# Patient Record
Sex: MALE | Race: WHITE | Employment: OTHER | URBAN - METROPOLITAN AREA
[De-identification: names, ages, dates, MRNs, and addresses within clinical notes are randomized per-mention and may not be internally consistent; named-entity substitution may affect disease eponyms.]

---

## 2017-01-01 ENCOUNTER — TELEPHONE (OUTPATIENT)
Dept: LAB | Facility: OTHER | Age: 82
End: 2017-01-01

## 2017-01-01 ENCOUNTER — CARE COORDINATION (OUTPATIENT)
Dept: CARDIOLOGY | Facility: CLINIC | Age: 82
End: 2017-01-01

## 2017-01-01 ENCOUNTER — TRANSFERRED RECORDS (OUTPATIENT)
Dept: HEALTH INFORMATION MANAGEMENT | Facility: CLINIC | Age: 82
End: 2017-01-01

## 2017-01-01 ENCOUNTER — PRE VISIT (OUTPATIENT)
Dept: CARDIOLOGY | Facility: CLINIC | Age: 82
End: 2017-01-01

## 2017-01-01 ENCOUNTER — OFFICE VISIT (OUTPATIENT)
Dept: CARDIOLOGY | Facility: CLINIC | Age: 82
End: 2017-01-01
Payer: MEDICARE

## 2017-01-01 ENCOUNTER — OFFICE VISIT (OUTPATIENT)
Dept: NEPHROLOGY | Facility: CLINIC | Age: 82
End: 2017-01-01
Attending: INTERNAL MEDICINE
Payer: MEDICARE

## 2017-01-01 ENCOUNTER — OFFICE VISIT (OUTPATIENT)
Dept: PULMONOLOGY | Facility: CLINIC | Age: 82
End: 2017-01-01
Attending: INTERNAL MEDICINE
Payer: MEDICARE

## 2017-01-01 ENCOUNTER — TELEPHONE (OUTPATIENT)
Dept: ANTICOAGULATION | Facility: OTHER | Age: 82
End: 2017-01-01

## 2017-01-01 ENCOUNTER — TELEPHONE (OUTPATIENT)
Dept: TRANSPLANT | Facility: CLINIC | Age: 82
End: 2017-01-01

## 2017-01-01 ENCOUNTER — DOCUMENTATION ONLY (OUTPATIENT)
Dept: CARDIOLOGY | Facility: CLINIC | Age: 82
End: 2017-01-01

## 2017-01-01 ENCOUNTER — DOCUMENTATION ONLY (OUTPATIENT)
Dept: TRANSPLANT | Facility: CLINIC | Age: 82
End: 2017-01-01

## 2017-01-01 ENCOUNTER — TELEPHONE (OUTPATIENT)
Dept: CARDIOLOGY | Facility: CLINIC | Age: 82
End: 2017-01-01

## 2017-01-01 ENCOUNTER — TELEPHONE (OUTPATIENT)
Dept: FAMILY MEDICINE | Facility: OTHER | Age: 82
End: 2017-01-01

## 2017-01-01 VITALS
TEMPERATURE: 97.8 F | HEIGHT: 66 IN | BODY MASS INDEX: 34.23 KG/M2 | DIASTOLIC BLOOD PRESSURE: 68 MMHG | HEART RATE: 69 BPM | RESPIRATION RATE: 18 BRPM | WEIGHT: 213 LBS | SYSTOLIC BLOOD PRESSURE: 115 MMHG | OXYGEN SATURATION: 93 %

## 2017-01-01 VITALS
BODY MASS INDEX: 32.28 KG/M2 | WEIGHT: 200 LBS | SYSTOLIC BLOOD PRESSURE: 124 MMHG | OXYGEN SATURATION: 96 % | HEART RATE: 86 BPM | DIASTOLIC BLOOD PRESSURE: 77 MMHG

## 2017-01-01 VITALS
TEMPERATURE: 98.5 F | SYSTOLIC BLOOD PRESSURE: 120 MMHG | BODY MASS INDEX: 32.75 KG/M2 | DIASTOLIC BLOOD PRESSURE: 66 MMHG | HEART RATE: 130 BPM | WEIGHT: 203.8 LBS | HEIGHT: 66 IN | OXYGEN SATURATION: 91 %

## 2017-01-01 VITALS
OXYGEN SATURATION: 94 % | BODY MASS INDEX: 33.89 KG/M2 | WEIGHT: 210 LBS | HEART RATE: 64 BPM | DIASTOLIC BLOOD PRESSURE: 58 MMHG | SYSTOLIC BLOOD PRESSURE: 97 MMHG

## 2017-01-01 VITALS
BODY MASS INDEX: 32.47 KG/M2 | HEIGHT: 66 IN | DIASTOLIC BLOOD PRESSURE: 71 MMHG | HEART RATE: 76 BPM | RESPIRATION RATE: 18 BRPM | SYSTOLIC BLOOD PRESSURE: 121 MMHG | OXYGEN SATURATION: 97 % | WEIGHT: 202 LBS | TEMPERATURE: 97.6 F

## 2017-01-01 VITALS
SYSTOLIC BLOOD PRESSURE: 123 MMHG | DIASTOLIC BLOOD PRESSURE: 77 MMHG | OXYGEN SATURATION: 92 % | BODY MASS INDEX: 34.38 KG/M2 | HEART RATE: 72 BPM | WEIGHT: 213 LBS

## 2017-01-01 VITALS
BODY MASS INDEX: 31.49 KG/M2 | WEIGHT: 195 LBS | HEART RATE: 75 BPM | SYSTOLIC BLOOD PRESSURE: 89 MMHG | DIASTOLIC BLOOD PRESSURE: 61 MMHG | OXYGEN SATURATION: 95 %

## 2017-01-01 VITALS
OXYGEN SATURATION: 97 % | WEIGHT: 213.6 LBS | BODY MASS INDEX: 34.48 KG/M2 | SYSTOLIC BLOOD PRESSURE: 111 MMHG | DIASTOLIC BLOOD PRESSURE: 68 MMHG | HEART RATE: 74 BPM

## 2017-01-01 DIAGNOSIS — I50.32 CHRONIC DIASTOLIC HEART FAILURE (H): ICD-10-CM

## 2017-01-01 DIAGNOSIS — Z94.0 KIDNEY REPLACED BY TRANSPLANT: ICD-10-CM

## 2017-01-01 DIAGNOSIS — Z48.298 AFTERCARE FOLLOWING ORGAN TRANSPLANT: ICD-10-CM

## 2017-01-01 DIAGNOSIS — I48.91 ATRIAL FIBRILLATION, UNSPECIFIED TYPE (H): Primary | ICD-10-CM

## 2017-01-01 DIAGNOSIS — N25.81 SECONDARY RENAL HYPERPARATHYROIDISM (H): Primary | ICD-10-CM

## 2017-01-01 DIAGNOSIS — R91.1 LUNG NODULE: Primary | ICD-10-CM

## 2017-01-01 DIAGNOSIS — Z94.0 KIDNEY REPLACED BY TRANSPLANT: Primary | ICD-10-CM

## 2017-01-01 DIAGNOSIS — J84.9 ILD (INTERSTITIAL LUNG DISEASE) (H): ICD-10-CM

## 2017-01-01 DIAGNOSIS — I50.32 CHRONIC DIASTOLIC HEART FAILURE (H): Primary | ICD-10-CM

## 2017-01-01 DIAGNOSIS — Z79.899 ENCOUNTER FOR LONG-TERM CURRENT USE OF MEDICATION: ICD-10-CM

## 2017-01-01 DIAGNOSIS — I48.21 PERMANENT ATRIAL FIBRILLATION (H): Primary | ICD-10-CM

## 2017-01-01 DIAGNOSIS — G89.4 CHRONIC PAIN SYNDROME: ICD-10-CM

## 2017-01-01 DIAGNOSIS — Z94.0 KIDNEY TRANSPLANTED: Primary | ICD-10-CM

## 2017-01-01 DIAGNOSIS — E55.9 VITAMIN D DEFICIENCY: ICD-10-CM

## 2017-01-01 DIAGNOSIS — Z94.0 KIDNEY TRANSPLANTED: ICD-10-CM

## 2017-01-01 DIAGNOSIS — Z94.9 ORGAN TRANSPLANT: Primary | ICD-10-CM

## 2017-01-01 DIAGNOSIS — I15.1 HYPERTENSION SECONDARY TO OTHER RENAL DISORDERS: ICD-10-CM

## 2017-01-01 DIAGNOSIS — D84.9 IMMUNOSUPPRESSED STATUS (H): ICD-10-CM

## 2017-01-01 LAB
ANION GAP SERPL CALCULATED.3IONS-SCNC: 10 MMOL/L (ref 3–14)
ANION GAP SERPL CALCULATED.3IONS-SCNC: 11 MMOL/L (ref 3–14)
ANION GAP SERPL CALCULATED.3IONS-SCNC: 12 MMOL/L (ref 3–14)
ANION GAP SERPL CALCULATED.3IONS-SCNC: 7 MMOL/L (ref 3–14)
ANION GAP SERPL CALCULATED.3IONS-SCNC: 7 MMOL/L (ref 3–14)
ANION GAP SERPL CALCULATED.3IONS-SCNC: 8 MMOL/L (ref 3–14)
BUN SERPL-MCNC: 45 MG/DL (ref 7–30)
BUN SERPL-MCNC: 47 MG/DL (ref 7–30)
BUN SERPL-MCNC: 62 MG/DL (ref 7–30)
BUN SERPL-MCNC: 65 MG/DL (ref 7–30)
BUN SERPL-MCNC: 77 MG/DL (ref 7–30)
BUN SERPL-MCNC: 81 MG/DL (ref 7–30)
BUN SERPL-MCNC: 82 MG/DL (ref 7–30)
BUN SERPL-MCNC: 93 MG/DL (ref 7–30)
CALCIUM SERPL-MCNC: 8.5 MG/DL (ref 8.5–10.1)
CALCIUM SERPL-MCNC: 8.6 MG/DL (ref 8.5–10.1)
CALCIUM SERPL-MCNC: 8.7 MG/DL (ref 8.5–10.1)
CALCIUM SERPL-MCNC: 9 MG/DL (ref 8.5–10.1)
CALCIUM SERPL-MCNC: 9.2 MG/DL (ref 8.5–10.1)
CALCIUM SERPL-MCNC: 9.3 MG/DL (ref 8.5–10.1)
CHLORIDE SERPL-SCNC: 104 MMOL/L (ref 94–109)
CHLORIDE SERPL-SCNC: 105 MMOL/L (ref 94–109)
CHLORIDE SERPL-SCNC: 107 MMOL/L (ref 94–109)
CHLORIDE SERPL-SCNC: 107 MMOL/L (ref 94–109)
CHLORIDE SERPL-SCNC: 108 MMOL/L (ref 94–109)
CHLORIDE SERPL-SCNC: 110 MMOL/L (ref 94–109)
CO2 SERPL-SCNC: 22 MMOL/L (ref 20–32)
CO2 SERPL-SCNC: 23 MMOL/L (ref 20–32)
CO2 SERPL-SCNC: 24 MMOL/L (ref 20–32)
CO2 SERPL-SCNC: 26 MMOL/L (ref 20–32)
CO2 SERPL-SCNC: 27 MMOL/L (ref 20–32)
CO2 SERPL-SCNC: 28 MMOL/L (ref 20–32)
CO2 SERPL-SCNC: 28 MMOL/L (ref 20–32)
CO2 SERPL-SCNC: 29 MMOL/L (ref 20–32)
CREAT SERPL-MCNC: 2.03 MG/DL (ref 0.66–1.25)
CREAT SERPL-MCNC: 2.08 MG/DL (ref 0.66–1.25)
CREAT SERPL-MCNC: 2.1 MG/DL (ref 0.66–1.25)
CREAT SERPL-MCNC: 2.14 MG/DL (ref 0.66–1.25)
CREAT SERPL-MCNC: 2.25 MG/DL (ref 0.66–1.25)
CREAT SERPL-MCNC: 2.28 MG/DL (ref 0.66–1.25)
CREAT SERPL-MCNC: 2.34 MG/DL (ref 0.66–1.25)
CREAT SERPL-MCNC: 2.42 MG/DL (ref 0.66–1.25)
CYCLOSPORINE BLD LC/MS/MS-MCNC: 42 UG/L (ref 50–400)
CYCLOSPORINE BLD LC/MS/MS-MCNC: 82 UG/L (ref 50–400)
CYCLOSPORINE BLD LC/MS/MS-MCNC: 84 UG/L (ref 50–400)
DLCOCOR-%PRED-PRE: 57 %
DLCOCOR-PRE: 12.34 ML/MIN/MMHG
DLCOUNC-%PRED-PRE: 56 %
DLCOUNC-PRE: 12.01 ML/MIN/MMHG
DLCOUNC-PRED: 21.32 ML/MIN/MMHG
ERV-%PRED-PRE: 74 %
ERV-PRE: 0.24 L
ERV-PRED: 0.32 L
ERYTHROCYTE [DISTWIDTH] IN BLOOD BY AUTOMATED COUNT: 14.2 % (ref 10–15)
ERYTHROCYTE [DISTWIDTH] IN BLOOD BY AUTOMATED COUNT: 14.5 % (ref 10–15)
ERYTHROCYTE [DISTWIDTH] IN BLOOD BY AUTOMATED COUNT: 14.8 % (ref 10–15)
EXPTIME-PRE: 7.68 SEC
FEF2575-%PRED-PRE: 109 %
FEF2575-PRE: 1.89 L/SEC
FEF2575-PRED: 1.72 L/SEC
FEFMAX-%PRED-PRE: 107 %
FEFMAX-PRE: 6.31 L/SEC
FEFMAX-PRED: 5.87 L/SEC
FEV1-%PRED-PRE: 74 %
FEV1-PRE: 1.82 L
FEV1FEV6-PRE: 83 %
FEV1FEV6-PRED: 76 %
FEV1FVC-PRE: 83 %
FEV1FVC-PRED: 75 %
FEV1SVC-PRE: 85 %
FEV1SVC-PRED: 65 %
FIFMAX-PRE: 4.33 L/SEC
FVC-%PRED-PRE: 67 %
FVC-PRE: 2.19 L
FVC-PRED: 3.27 L
GFR SERPL CREATININE-BSD FRML MDRD: 26 ML/MIN/1.7M2
GFR SERPL CREATININE-BSD FRML MDRD: 27 ML/MIN/1.7M2
GFR SERPL CREATININE-BSD FRML MDRD: 27 ML/MIN/1.7M2
GFR SERPL CREATININE-BSD FRML MDRD: 28 ML/MIN/1.7M2
GFR SERPL CREATININE-BSD FRML MDRD: 30 ML/MIN/1.7M2
GFR SERPL CREATININE-BSD FRML MDRD: 30 ML/MIN/1.7M2
GFR SERPL CREATININE-BSD FRML MDRD: 31 ML/MIN/1.7M2
GFR SERPL CREATININE-BSD FRML MDRD: 31 ML/MIN/1.7M2
GLUCOSE SERPL-MCNC: 117 MG/DL (ref 70–99)
GLUCOSE SERPL-MCNC: 147 MG/DL (ref 70–99)
GLUCOSE SERPL-MCNC: 156 MG/DL (ref 70–99)
GLUCOSE SERPL-MCNC: 172 MG/DL (ref 70–99)
GLUCOSE SERPL-MCNC: 197 MG/DL (ref 70–99)
GLUCOSE SERPL-MCNC: 208 MG/DL (ref 70–99)
GLUCOSE SERPL-MCNC: 219 MG/DL (ref 70–99)
GLUCOSE SERPL-MCNC: 78 MG/DL (ref 70–99)
HCT VFR BLD AUTO: 42.5 % (ref 40–53)
HCT VFR BLD AUTO: 43.5 % (ref 40–53)
HCT VFR BLD AUTO: 43.5 % (ref 40–53)
HGB BLD-MCNC: 13.6 G/DL (ref 13.3–17.7)
HGB BLD-MCNC: 13.7 G/DL (ref 13.3–17.7)
HGB BLD-MCNC: 13.7 G/DL (ref 13.3–17.7)
IC-%PRED-PRE: 55 %
IC-PRE: 1.91 L
IC-PRED: 3.42 L
INR BLD: 1 (ref 0.86–1.14)
INR BLD: 1 (ref 0.86–1.14)
INR PPP: 0.97 (ref 0.86–1.14)
INR PPP: 0.98 (ref 0.86–1.14)
INR PPP: 0.99 (ref 0.86–1.14)
MCH RBC QN AUTO: 27.6 PG (ref 26.5–33)
MCH RBC QN AUTO: 27.8 PG (ref 26.5–33)
MCH RBC QN AUTO: 27.8 PG (ref 26.5–33)
MCHC RBC AUTO-ENTMCNC: 31.5 G/DL (ref 31.5–36.5)
MCHC RBC AUTO-ENTMCNC: 31.5 G/DL (ref 31.5–36.5)
MCHC RBC AUTO-ENTMCNC: 32 G/DL (ref 31.5–36.5)
MCV RBC AUTO: 87 FL (ref 78–100)
MCV RBC AUTO: 88 FL (ref 78–100)
MCV RBC AUTO: 88 FL (ref 78–100)
PLATELET # BLD AUTO: 137 10E9/L (ref 150–450)
PLATELET # BLD AUTO: 153 10E9/L (ref 150–450)
PLATELET # BLD AUTO: 157 10E9/L (ref 150–450)
POTASSIUM SERPL-SCNC: 4.4 MMOL/L (ref 3.4–5.3)
POTASSIUM SERPL-SCNC: 4.4 MMOL/L (ref 3.4–5.3)
POTASSIUM SERPL-SCNC: 4.5 MMOL/L (ref 3.4–5.3)
POTASSIUM SERPL-SCNC: 4.5 MMOL/L (ref 3.4–5.3)
POTASSIUM SERPL-SCNC: 4.6 MMOL/L (ref 3.4–5.3)
POTASSIUM SERPL-SCNC: 4.6 MMOL/L (ref 3.4–5.3)
POTASSIUM SERPL-SCNC: 4.7 MMOL/L (ref 3.4–5.3)
POTASSIUM SERPL-SCNC: 4.7 MMOL/L (ref 3.4–5.3)
RBC # BLD AUTO: 4.89 10E12/L (ref 4.4–5.9)
RBC # BLD AUTO: 4.92 10E12/L (ref 4.4–5.9)
RBC # BLD AUTO: 4.96 10E12/L (ref 4.4–5.9)
SODIUM SERPL-SCNC: 140 MMOL/L (ref 133–144)
SODIUM SERPL-SCNC: 140 MMOL/L (ref 133–144)
SODIUM SERPL-SCNC: 141 MMOL/L (ref 133–144)
SODIUM SERPL-SCNC: 142 MMOL/L (ref 133–144)
SODIUM SERPL-SCNC: 142 MMOL/L (ref 133–144)
SODIUM SERPL-SCNC: 143 MMOL/L (ref 133–144)
SODIUM SERPL-SCNC: 145 MMOL/L (ref 133–144)
SODIUM SERPL-SCNC: 146 MMOL/L (ref 133–144)
TME LAST DOSE: 2200 H
VA-%PRED-PRE: 58 %
VA-PRE: 3.53 L
VC-%PRED-PRE: 57 %
VC-PRE: 2.14 L
VC-PRED: 3.74 L
WBC # BLD AUTO: 4.8 10E9/L (ref 4–11)
WBC # BLD AUTO: 4.8 10E9/L (ref 4–11)
WBC # BLD AUTO: 5 10E9/L (ref 4–11)

## 2017-01-01 PROCEDURE — 36415 COLL VENOUS BLD VENIPUNCTURE: CPT | Performed by: PHYSICIAN ASSISTANT

## 2017-01-01 PROCEDURE — 80158 DRUG ASSAY CYCLOSPORINE: CPT | Performed by: INTERNAL MEDICINE

## 2017-01-01 PROCEDURE — 36415 COLL VENOUS BLD VENIPUNCTURE: CPT | Performed by: NURSE PRACTITIONER

## 2017-01-01 PROCEDURE — 85027 COMPLETE CBC AUTOMATED: CPT | Performed by: FAMILY MEDICINE

## 2017-01-01 PROCEDURE — 36415 COLL VENOUS BLD VENIPUNCTURE: CPT | Performed by: FAMILY MEDICINE

## 2017-01-01 PROCEDURE — 99214 OFFICE O/P EST MOD 30 MIN: CPT | Performed by: NURSE PRACTITIONER

## 2017-01-01 PROCEDURE — 85610 PROTHROMBIN TIME: CPT | Performed by: INTERNAL MEDICINE

## 2017-01-01 PROCEDURE — 85610 PROTHROMBIN TIME: CPT | Performed by: NURSE PRACTITIONER

## 2017-01-01 PROCEDURE — 80048 BASIC METABOLIC PNL TOTAL CA: CPT | Performed by: NURSE PRACTITIONER

## 2017-01-01 PROCEDURE — 99212 OFFICE O/P EST SF 10 MIN: CPT | Mod: ZF

## 2017-01-01 PROCEDURE — 85610 PROTHROMBIN TIME: CPT | Mod: QW | Performed by: PHYSICIAN ASSISTANT

## 2017-01-01 PROCEDURE — 85610 PROTHROMBIN TIME: CPT | Mod: QW | Performed by: FAMILY MEDICINE

## 2017-01-01 PROCEDURE — 80048 BASIC METABOLIC PNL TOTAL CA: CPT | Performed by: INTERNAL MEDICINE

## 2017-01-01 PROCEDURE — 99214 OFFICE O/P EST MOD 30 MIN: CPT | Performed by: INTERNAL MEDICINE

## 2017-01-01 PROCEDURE — 99207 ZZC NO CHARGE LOS: CPT

## 2017-01-01 PROCEDURE — 85027 COMPLETE CBC AUTOMATED: CPT | Performed by: NURSE PRACTITIONER

## 2017-01-01 PROCEDURE — 99211 OFF/OP EST MAY X REQ PHY/QHP: CPT

## 2017-01-01 PROCEDURE — 36415 COLL VENOUS BLD VENIPUNCTURE: CPT | Performed by: INTERNAL MEDICINE

## 2017-01-01 RX ORDER — FUROSEMIDE 40 MG
TABLET ORAL
Qty: 150 TABLET | Refills: 11 | Status: SHIPPED | OUTPATIENT
Start: 2017-01-01 | End: 2017-01-01

## 2017-01-01 RX ORDER — METOLAZONE 2.5 MG/1
2.5 TABLET ORAL DAILY
Qty: 5 TABLET | Refills: 0 | Status: SHIPPED | OUTPATIENT
Start: 2017-01-01 | End: 2017-01-01

## 2017-01-01 RX ORDER — SULFAMETHOXAZOLE AND TRIMETHOPRIM 400; 80 MG/1; MG/1
1 TABLET ORAL
Qty: 12 TABLET | Refills: 11 | Status: SHIPPED | OUTPATIENT
Start: 2017-01-01

## 2017-01-01 RX ORDER — FUROSEMIDE 40 MG
TABLET ORAL
Qty: 150 TABLET | Refills: 11 | Status: SHIPPED | OUTPATIENT
Start: 2017-01-01

## 2017-01-01 RX ORDER — MYCOPHENOLATE MOFETIL 250 MG/1
750 CAPSULE ORAL 2 TIMES DAILY
Qty: 180 CAPSULE | Refills: 11 | Status: SHIPPED | OUTPATIENT
Start: 2017-01-01

## 2017-01-01 RX ORDER — FUROSEMIDE 40 MG
80 TABLET ORAL 3 TIMES DAILY
Qty: 150 TABLET | Refills: 11 | Status: SHIPPED
Start: 2017-01-01 | End: 2017-01-01

## 2017-01-01 RX ORDER — CYCLOSPORINE 25 MG/1
50 CAPSULE, LIQUID FILLED ORAL EVERY 12 HOURS
Qty: 120 CAPSULE | Refills: 0 | Status: SHIPPED | OUTPATIENT
Start: 2017-01-01

## 2017-01-01 RX ORDER — METOLAZONE 2.5 MG/1
TABLET ORAL
Qty: 30 TABLET | Refills: 3 | Status: SHIPPED | OUTPATIENT
Start: 2017-01-01

## 2017-01-01 RX ORDER — MIRTAZAPINE 15 MG/1
45 TABLET, FILM COATED ORAL AT BEDTIME
Qty: 30 TABLET | COMMUNITY
Start: 2017-01-01

## 2017-01-01 ASSESSMENT — PAIN SCALES - GENERAL
PAINLEVEL: MODERATE PAIN (4)
PAINLEVEL: MODERATE PAIN (5)
PAINLEVEL: MILD PAIN (2)
PAINLEVEL: NO PAIN (0)
PAINLEVEL: NO PAIN (0)
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
## 2017-02-01 NOTE — TELEPHONE ENCOUNTER
Cr elevated slightly  Please call pt to discuss. Any illness, medication changes, is he hydrating well? Encourage fluid intake and repeat labs in 1-2 weeks.

## 2017-02-01 NOTE — PATIENT INSTRUCTIONS
Thank you for coming to the [facility] Heart @ [facility];  please note the following instructions:    1.  Take an extra 40mg of lasix daily until you lose a few pounds . Please contact us if your weight is not going down.    2.  Labs in 1 week; we have scheduled this appointment for Tuesday, 02/07/2017 at 9:00 AM at the [facility] Lab.    3.  Please make a follow-up CORE/heart failure appt with Susy in 6 weeks with labs; We have scheduled you for a [facility] follow up appointment with JERSON Harding CNP ([facility] Clinic) at the [facility]  ([address] [place] [address]) on Wednesday, 03/15/2017 at 12:30 PM.  We have also scheduled you for a pre-blood work lab appointment for your [facility] follow up appointment.  We have scheduled you for Tuesday, 03/14/2017 at 11:00 AM at the [facility].  We look forward to seeing you again.              Results for HAYDER GONSALEZ (MRN [id number]) as of 2/1/2017 13:21   Ref. Range 1/31/2017 09:35   Sodium Latest Ref Range: 133-144 mmol/L 142   Potassium Latest Ref Range: 3.4-5.3 mmol/L 4.7   Chloride Latest Ref Range:  mmol/L 110 (H)   Carbon Dioxide Latest Ref Range: 20-32 mmol/L 22   Urea Nitrogen Latest Ref Range: 7-30 mg/dL 93 (H)   Creatinine Latest Ref Range: 0.66-1.25 mg/dL 2.25 (H)   GFR Estimate Latest Ref Range: >60 mL/min/1.7m2 28 (L)   GFR Estimate If Black Latest Ref Range: >60 mL/min/1.7m2 34 (L)   Calcium Latest Ref Range: 8.5-10.1 mg/dL 8.5   Anion Gap Latest Ref Range: 3-14 mmol/L 10   Glucose Latest Ref Range: 70-99 mg/dL 147 (H)       Please continue to follow a low salt diet, limit your water intake to 2 liters per day and continue to exercise. 2 Liters a day = 8.5 cups, or 72 ounces.    If you gain 2# in 24 hours or 5# in one week call Ricky Oh RN so we can adjust your medications as needed over the phone.    [facility] Cardiology - [place] Location    If you have any questions  regarding to your visit please contact your care team:     Cardiology  Telephone Number   Ricky HONG, Cardiology RN  Savita ODELL, FUENTES HONG, [facility] [phone]; option 1 and then option 3   For scheduling appts:     [phone]           If you need a medication refill please contact your pharmacy.  Please allow 3 business days for your refill to be completed.    As always, Thank you for trusting us with your health care needs!  __________________________________________________________  [facility] Cardiomyopathy, Optimization, Rehabilitation, Education    The [facility]INIC is a heart failure specialty clinic within the [facility] where you will work with specialized nurse practioners dedicated to helping patients with heart failure carefully adjust medications, receive education, and learn who and when to call if symptoms develop.  They specialize in helping you better understand your condition, slow the progression of your disease, improve the length and quality of your life, help you detect future heart problems before they become life threatening, and avoid hospitalizations.

## 2017-02-01 NOTE — NURSING NOTE
"Chief Complaint   Patient presents with     Follow Up For     4 month Return CORE appt; 83yr old male with a h/o chronic diastolic heart failure/HFpEF presenting for follow up and labs prior. Patient reports he is up 5# and thinks it is related to fluid retention.  He has SOB/RUIZ that is unchanged from his last visit.  He also notes an episode of upper left chest pain yesterday (did NOT use NTG).       Initial BP 89/61 mmHg  Pulse 75  Wt 88.451 kg (195 lb)  SpO2 95% Estimated body mass index is 31.49 kg/(m^2) as calculated from the following:    Height as of 11/9/16: 1.676 m (5' 6\").    Weight as of this encounter: 88.451 kg (195 lb)..  BP completed using cuff size: donato Callejas M.A.          "
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
## 2017-02-01 NOTE — MR AVS SNAPSHOT
After Visit Summary   2/1/2017    Hayder Oconnor    MRN: [id number]           Patient Information     Date Of Birth          7/5/1933        Visit Information        Provider Department      2/1/2017 12:30 PM Susy Montoya NP [facility] PHYSICIANS HEART AT [facility]        Today's Diagnoses     Chronic diastolic heart failure (H)    -  1       Care Instructions    Thank you for coming to the [facility] @ [facility];  please note the following instructions:    1.  Take an extra 40mg of lasix daily until you lose a few pounds . Please contact us if your weight is not going down.    2.  Labs in 1 week; we have scheduled this appointment for Tuesday, 02/07/2017 at 9:00 AM at the [facility].    3.  Please make a follow-up CORE/heart failure appt with Susy in 6 weeks with labs; We have scheduled you for a C.O.R.E Clinic follow up appointment with JERSON Harding, CNP (C.O.R.E Clinic) at the [facility]  ([address] [place] [address]) on Wednesday, 03/15/2017 at 12:30 PM.  We have also scheduled you for a pre-blood work lab appointment for your C.O.R.E Clinic follow up appointment.  We have scheduled you for Tuesday, 03/14/2017 at 11:00 AM at the [facility].  We look forward to seeing you again.              Results for HAYDER OCONNOR (MRN [id number]) as of 2/1/2017 13:21   Ref. Range 1/31/2017 09:35   Sodium Latest Ref Range: 133-144 mmol/L 142   Potassium Latest Ref Range: 3.4-5.3 mmol/L 4.7   Chloride Latest Ref Range:  mmol/L 110 (H)   Carbon Dioxide Latest Ref Range: 20-32 mmol/L 22   Urea Nitrogen Latest Ref Range: 7-30 mg/dL 93 (H)   Creatinine Latest Ref Range: 0.66-1.25 mg/dL 2.25 (H)   GFR Estimate Latest Ref Range: >60 mL/min/1.7m2 28 (L)   GFR Estimate If Black Latest Ref Range: >60 mL/min/1.7m2 34 (L)   Calcium Latest Ref Range: 8.5-10.1 mg/dL 8.5   Anion Gap Latest Ref Range: 3-14 mmol/L 10    Glucose Latest Ref Range: 70-99 mg/dL 147 (H)       Please continue to follow a low salt diet, limit your water intake to 2 liters per day and continue to exercise. 2 Liters a day = 8.5 cups, or 72 ounces.    If you gain 2# in 24 hours or 5# in one week call Ricky Oh RN so we can adjust your medications as needed over the phone.    [facility] Cardiology - [place] Location    If you have any questions regarding to your visit please contact your care team:     Cardiology  Telephone Number   Ricky HONG, Cardiology RN  Savita ODELL, FUENTES HONG, [facility] [phone]; option 1 and then option 3   For scheduling appts:     [phone]           If you need a medication refill please contact your pharmacy.  Please allow 3 business days for your refill to be completed.    As always, Thank you for trusting us with your health care needs!  __________________________________________________________  C.O.R.E. CLINIC Cardiomyopathy, Optimization, Rehabilitation, Education    The C.O.R.E. CLINIC is a heart failure specialty clinic within the [facility] where you will work with specialized nurse practioners dedicated to helping patients with heart failure carefully adjust medications, receive education, and learn who and when to call if symptoms develop.  They specialize in helping you better understand your condition, slow the progression of your disease, improve the length and quality of your life, help you detect future heart problems before they become life threatening, and avoid hospitalizations.            Follow-ups after your visit        Your next 10 appointments already scheduled     Feb 07, 2017  9:00 AM   LAB with NL LAB [facility] ([facility])    [address] [facility] [address] [phone]           Patient must bring picture ID.  Patient should be prepared to give a urine specimen  Please do not eat 10-12 hours before your appointment if you  are coming in fasting for labs on lipids, cholesterol, or glucose (sugar).  Pregnant women should follow their Care Team instructions. Water with medications is okay. Do not drink coffee or other fluids.   If you have concerns about taking  your medications, please ask at office or if scheduling via [employer], send a message by clicking on Secure Messaging, Message Your Care Team.            Mar 14, 2017 11:00 AM   LAB with NL LAB [facility] ([facility])    [address] [facility] [id number] [phone]           Patient must bring picture ID.  Patient should be prepared to give a urine specimen  Please do not eat 10-12 hours before your appointment if you are coming in fasting for labs on lipids, cholesterol, or glucose (sugar).  Pregnant women should follow their Care Team instructions. Water with medications is okay. Do not drink coffee or other fluids.   If you have concerns about taking  your medications, please ask at office or if scheduling via [employer], send a message by clicking on Secure Messaging, Message Your Care Team.            Mar 15, 2017 12:30 PM   Return Visit with Susy Montoya NP   [facility] PHYSICIANS HEART AT [facility] ([facility])    [address] [place] [id number] [phone]            Apr 07, 2017 10:00 AM   PFT VISIT with HARRISON PEDERSON   [facility] Pulmonary Function Testing ([facility])    [address] [id number] [phone]            Apr 07, 2017 10:30 AM   (Arrive by 10:15 AM)   Return Visit with Eh Munoz MD   [facility] ([facility])    [address] [facility]  3rd [facility] [id number] [phone]            Apr 18, 2017 11:30 AM   Return Visit with Josue Wilkinson MD   [facility] ([facility])    [address]  [facility] [address]   [phone]            May 23, 2017  3:35 PM   (Arrive by 3:05 PM)   Return Kidney Transplant with Vaughn Holley MD   [facility] ([facility])    [address] [facility]  [address] [facility] [id number] [phone]              Future tests that were ordered for you today     Open Future Orders        Priority Expected Expires Ordered    Basic metabolic panel Routine 2/8/2017 2/13/2017 2/1/2017            Who to contact     If you have questions or need follow up information about today's clinic visit or your schedule please contact [facility] PHYSICIANS HEART AT [facility] directly at [phone].  Normal or non-critical lab and imaging results will be communicated to you by MyChart, letter or phone within 4 business days after the clinic has received the results. If you do not hear from us within 7 days, please contact the clinic through [employer] or phone. If you have a critical or abnormal lab result, we will notify you by phone as soon as possible.  Submit refill requests through [employer] or call your pharmacy and they will forward the refill request to us. Please allow 3 business days for your refill to be completed.          Additional Information About Your Visit        [employer] Information     [employer] gives you secure access to your electronic health record. If you see a primary care provider, you can also send messages to your care team and make appointments. If you have questions, please call your primary care clinic.  If you do not have a primary care provider, please call [phone] and they will assist you.        Care EveryWhere ID     This is your Care EveryWhere ID. This could be used by other organizations to access your [place] medical records  [id number]        Your Vitals Were     Pulse Pulse Oximetry                75 95%           Blood Pressure from Last 3 Encounters:   02/01/17 89/61    11/09/16 100/60   10/25/16 111/65    Weight from Last 3 Encounters:   02/01/17 88.451 kg (195 lb)   11/09/16 87.544 kg (193 lb)   10/25/16 88.361 kg (194 lb 12.8 oz)                 Today's Medication Changes          These changes are accurate as of: 2/1/17  1:34 PM.  If you have any questions, ask your nurse or doctor.               These medicines have changed or have updated prescriptions.        Dose/Directions    furosemide 40 MG tablet   Commonly known as:  LASIX   This may have changed:  how much to take   Used for:  Chronic diastolic heart failure (H), Chronic diastolic heart failure (H)        Dose:  40 mg   Take 1 tablet (40 mg) by mouth 2 times daily   Quantity:  60 tablet   Refills:  3       gabapentin 100 MG capsule   Commonly known as:  NEURONTIN   This may have changed:  when to take this   Used for:  Chronic pain syndrome        Dose:  100 mg   Take 1 capsule (100 mg) by mouth daily   Quantity:  90 capsule   Refills:  0       glipiZIDE 5 MG tablet   Commonly known as:  GLUCOTROL   This may have changed:  how much to take   Used for:  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)        Dose:  5 mg   Take 1 tablet (5 mg) by mouth 2 times daily (before meals)   Quantity:  30 tablet   Refills:  0       mirtazapine 15 MG tablet   Commonly known as:  REMERON   This may have changed:    - how much to take  - additional instructions   Used for:  Chronic pain syndrome        Dose:  15 mg   Take 1 tablet (15 mg) by mouth At Bedtime   Quantity:  30 tablet   Refills:  0       omeprazole 20 MG CR capsule   Commonly known as:  priLOSEC   This may have changed:    - how much to take  - additional instructions   Used for:  Upset stomach        Dose:  20 mg   Take 1 capsule (20 mg) by mouth 2 times daily   Quantity:  90 capsule   Refills:  0                Primary Care Provider Office Phone # Fax #    Trinidad Valencia PA-C [phone]       [place] [address]  [address] [facility] [address]  [facility] [address]        Thank you!     Thank you for choosing [facility] PHYSICIANS HEART AT [facility]  for your care. Our goal is always to provide you with excellent care. Hearing back from our patients is one way we can continue to improve our services. Please take a few minutes to complete the written survey that you may receive in the mail after your visit with us. Thank you!             Your Updated Medication List - Protect others around you: Learn how to safely use, store and throw away your medicines at www.disposemymeds.org.          This list is accurate as of: 2/1/17  1:34 PM.  Always use your most recent med list.                   Brand Name Dispense Instructions for use    acidophilus Caps      Take 1 tablet by mouth daily       albuterol 108 (90 BASE) MCG/ACT Inhaler    PROAIR HFA/PROVENTIL HFA/VENTOLIN HFA    1 Inhaler    Inhale 2 puffs every 4-6 hours as needed for shortness of breath.       APIXABAN PO      Take 2.5 mg by mouth 2 times daily       blood glucose monitoring test strip    ACCU-CHEK DAT    100 each    Use to test blood sugars 3 times daily or as directed.       calcium carbonate 500 MG tablet    OS-RASTA 500 mg [place]. Ca    90 tablet    Take 1 tablet (500 mg) by mouth daily       carvedilol 3.125 MG tablet    COREG    180 tablet    Take 1 tablet (3.125 mg) by mouth 2 times daily (with meals)       [employer] NATURAL FISH OIL 1000 MG Caps     90 capsule    Take 1 g by mouth daily       cycloSPORINE modified capsule     120 capsule    Take 2 capsules (50 mg) by mouth every 12 hours       furosemide 40 MG tablet    LASIX    60 tablet    Take 1 tablet (40 mg) by mouth 2 times daily       gabapentin 100 MG capsule    NEURONTIN    90 capsule    Take 1 capsule (100 mg) by mouth daily       glipiZIDE 5 MG tablet    GLUCOTROL    30 tablet    Take 1 tablet (5 mg) by mouth 2 times daily (before meals)       hydroxypropyl methylcellulose 0.2 % Soln ophthalmic  solution    GENTEAL    1 Bottle    Apply 1 drop to eye 4 times daily as needed Both eyes       insulin glargine 100 UNIT/ML injection    LANTUS     Inject Subcutaneous At Bedtime       isosorbide mononitrate 60 MG 24 hr tablet    IMDUR    90 tablet    Take 1 tablet (60 mg) by mouth daily       levETIRAcetam 500 MG tablet    KEPPRA    60 tablet    Take 1 tablet (500 mg) by mouth 2 times daily       loratadine 10 MG tablet    CLARITIN    30 tablet    Take 1 tablet (10 mg) by mouth three times a week Take on Monday, Wednesday and Friday       mirtazapine 15 MG tablet    REMERON    30 tablet    Take 1 tablet (15 mg) by mouth At Bedtime       mycophenolate capsule     180 capsule    Take 3 capsules (750 mg) by mouth every 12 hours       nitroglycerin 0.4 MG sublingual tablet    NITROSTAT    25 tablet    Place 1 tablet (0.4 mg) under the tongue every 5 minutes as needed for chest pain       omeprazole 20 MG CR capsule    priLOSEC    90 capsule    Take 1 capsule (20 mg) by mouth 2 times daily       order for DME     1 each    Equipment being ordered:   Glucose testing monitor with test strips and lancets. Tests 1 time per day.       polyethylene glycol Packet    MIRALAX/GLYCOLAX    7 packet    Take 17 g by mouth daily as needed for constipation       senna-docusate 8.6-50 MG per tablet    SENOKOT-S;PERICOLACE    30 tablet    Take 1 tablet by mouth 2 times daily as needed       sertraline 25 MG tablet    ZOLOFT    30 tablet    Take 1 tablet (25 mg) by mouth 2 times daily       simvastatin 10 MG tablet    ZOCOR    90 tablet    Take 1 tablet (10 mg) by mouth At Bedtime       sodium chloride 0.65 % nasal spray    OCEAN     Spray 1 spray into both nostrils every hour as needed for congestion       sulfamethoxazole-trimethoprim 400-80 MG per tablet    BACTRIM/SEPTRA    12 tablet    Take 1 tablet by mouth three times a week M,W,F for pcp prophylaxis       tamsulosin 0.4 MG capsule    FLOMAX    90 capsule    Take 1 capsule (0.4  mg) by mouth daily       VITAMIN B-12 IJ      Inject 1,000 mcg as directed every 30 days.

## 2017-02-01 NOTE — TELEPHONE ENCOUNTER
Left message for patient regarding elevated Creatinine.  Asked patient to return call to transplant center.

## 2017-02-02 NOTE — TELEPHONE ENCOUNTER
Spoke to patient regarding elevated creatinine level.  Patient recently started a higher dose of Lasix and will have labs repeated in 1 week.  Patient states that he is staying hydrated.  Encouraged hydration and updated lab orders in Epic.

## 2017-03-15 NOTE — PATIENT INSTRUCTIONS
1.  Please increase Lasix to 80 mg twice daily and take an additional 40 mg as needed for weight gain or shortness of breath    2.  If you require additional doses of Lasix more than once or twice weekly, please call us    3.  Return to see Dr. Wilkinson in April and to [facility] in July. We will call you to schedule.      Please continue to follow a low salt diet, limit your water intake to 2 liters per day and continue to exercise. 2 Liters a day = 8.5 cups, or 72 ounces.    If you gain 2# in 24 hours or 5# in one week call Ricky Oh RN so we can adjust your medications as needed over the phone.    [facility] Cardiology - [place] Location    If you have any questions regarding to your visit please contact your care team:     Cardiology  Telephone Number   Ricky Oh, Cardiology RN.  Nirmala Macdonald -[phone] ( press option 1, and then 3)    After hours: [phone]   For scheduling appts:     [phone]            If you need a medication refill please contact your pharmacy.  Please allow 3 business days for your refill to be completed.    As always, Thank you for trusting us with your health care needs!  __________________________________________________________  C.O.R.E. CLINIC Cardiomyopathy, Optimization, Rehabilitation, Education    The C.O.R.E. CLINIC is a heart failure specialty clinic within the [facility] where you will work with specialized nurse practioners dedicated to helping patients with heart failure carefully adjust medications, receive education, and learn who and when to call if symptoms develop.  They specialize in helping you better understand your condition, slow the progression of your disease, improve the length and quality of your life, help you detect future heart problems before they become life threatening, and avoid hospitalizations.
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
## 2017-03-15 NOTE — NURSING NOTE
"Chief Complaint   Patient presents with     RECHECK     Return [facility] appt; 83yr old male with a h/o chronic diastolic heart failure/HFpEF presenting for follow up and labs prior. States he is Feeling ok, has some SOB that comes and goes. No swelling but weight has gone up/       Initial /77 (BP Location: Right arm, Patient Position: Chair, Cuff Size: Adult Regular)  Pulse 86  Wt 200 lb (90.7 kg)  SpO2 96%  BMI 32.28 kg/m2 Estimated body mass index is 32.28 kg/(m^2) as calculated from the following:    Height as of 11/9/16: 5' 6\" (1.676 m).    Weight as of this encounter: 200 lb (90.7 kg)..  BP completed using cuff size: regular    Olivia Macdonald CMA       "
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
## 2017-03-15 NOTE — MR AVS SNAPSHOT
After Visit Summary   3/15/2017    Ramos Oconnor    MRN: [id number]           Patient Information     Date Of Birth          7/5/1933        Visit Information        Provider Department      3/15/2017 12:30 PM Susy Montoya NP [facility] PHYSICIANS HEART AT [facility]        Today's Diagnoses     Chronic diastolic heart failure        Chronic diastolic heart failure (H)          Care Instructions    1.  Please increase Lasix to 80 mg twice daily and take an additional 40 mg as needed for weight gain or shortness of breath    2.  If you require additional doses of Lasix more than once or twice weekly, please call us    3.  Return to see Dr. Wilkinson in April and to [facility] in July. We will call you to schedule.      Please continue to follow a low salt diet, limit your water intake to 2 liters per day and continue to exercise. 2 Liters a day = 8.5 cups, or 72 ounces.    If you gain 2# in 24 hours or 5# in one week call Ricky Oh RN so we can adjust your medications as needed over the phone.    [facility] Cardiology - [place] Location    If you have any questions regarding to your visit please contact your care team:     Cardiology  Telephone Number   Ricky Oh Cardiology RN.  Nirmala Macdonald -[phone] ( press option 1, and then 3)    After hours: [phone]   For scheduling appts:     [phone]            If you need a medication refill please contact your pharmacy.  Please allow 3 business days for your refill to be completed.    As always, Thank you for trusting us with your health care needs!  __________________________________________________________  [facility] Cardiomyopathy, Optimization, Rehabilitation, Education    The [facility].E[facility]INIC is a heart failure specialty clinic within the [facility] where you will work with specialized nurse practioners dedicated to helping patients with heart failure carefully adjust medications, receive  education, and learn who and when to call if symptoms develop.  They specialize in helping you better understand your condition, slow the progression of your disease, improve the length and quality of your life, help you detect future heart problems before they become life threatening, and avoid hospitalizations.          Follow-ups after your visit        Your next 10 appointments already scheduled     Apr 07, 2017 10:00 AM CDT   PFT VISIT with HARRISON PEDERSON   [facility] Pulmonary Function Testing ([facility])    [address] [place] [address] [id number] [phone]            Apr 07, 2017 10:30 AM CDT   (Arrive by 10:15 AM)   Return Visit with Eh Munoz MD   [facility] ([facility])    [address] [place] [address] [id number] [phone]            Apr 18, 2017 11:30 AM CDT   Return Visit with Josue Wilkinson MD   [facility] ([facility])    [address] [place] [address] [id number] [phone]            May 23, 2017  3:35 PM CDT   (Arrive by 3:05 PM)   Return Kidney Transplant with Vaughn Holley MD   [facility] ([facility])    [address] [place] [address] [phone]              Who to contact     If you have questions or need follow up information about today's clinic visit or your schedule please contact [facility] PHYSICIANS [facility] AT [facility] directly at [phone].  Normal or non-critical lab and imaging results will be communicated to you by MyChart, letter or phone within 4 business days after the clinic has received the results. If you do not hear from us within 7 days, please contact the clinic through MyChart or phone. If you have a critical or abnormal lab result, we will notify you by phone as soon as possible.  Submit refill requests  through [employer] or call your pharmacy and they will forward the refill request to us. Please allow 3 business days for your refill to be completed.          Additional Information About Your Visit        [email or web address] Information     [employer] gives you secure access to your electronic health record. If you see a primary care provider, you can also send messages to your care team and make appointments. If you have questions, please call your primary care clinic.  If you do not have a primary care provider, please call [phone] and they will assist you.        Care EveryWhere ID     This is your Care EveryWhere ID. This could be used by other organizations to access your [facility] medical records  [id number]        Your Vitals Were     Pulse Pulse Oximetry BMI (Body Mass Index)             86 96% 32.28 kg/m2          Blood Pressure from Last 3 Encounters:   03/15/17 124/77   02/01/17 (!) 89/61   11/09/16 100/60    Weight from Last 3 Encounters:   03/15/17 90.7 kg (200 lb)   02/01/17 88.5 kg (195 lb)   11/09/16 87.5 kg (193 lb)              Today, you had the following     No orders found for display         Today's Medication Changes          These changes are accurate as of: 3/15/17 12:55 PM.  If you have any questions, ask your nurse or doctor.               These medicines have changed or have updated prescriptions.        Dose/Directions    furosemide 40 MG tablet   Commonly known as:  LASIX   This may have changed:    - how much to take  - when to take this  - additional instructions   Used for:  Chronic diastolic heart failure (H), Chronic diastolic heart failure (H)   Changed by:  Susy Montoya NP        Dose:  80 mg   Take 2 tablets (80 mg) by mouth 3 times daily Take Lasix 80 mg twice daily and an addition 40 mg daily, as needed for weight gain and shortness of breath   Quantity:  150 tablet   Refills:  11       gabapentin 100 MG capsule   Commonly known as:  NEURONTIN   This may have changed:  when to  take this   Used for:  Chronic pain syndrome        Dose:  100 mg   Take 1 capsule (100 mg) by mouth daily   Quantity:  90 capsule   Refills:  0       glipiZIDE 5 MG tablet   Commonly known as:  GLUCOTROL   This may have changed:  how much to take   Used for:  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)        Dose:  5 mg   Take 1 tablet (5 mg) by mouth 2 times daily (before meals)   Quantity:  30 tablet   Refills:  0       mirtazapine 15 MG tablet   Commonly known as:  REMERON   This may have changed:    - how much to take  - additional instructions   Used for:  Chronic pain syndrome        Dose:  15 mg   Take 1 tablet (15 mg) by mouth At Bedtime   Quantity:  30 tablet   Refills:  0       omeprazole 20 MG CR capsule   Commonly known as:  priLOSEC   This may have changed:    - how much to take  - additional instructions   Used for:  Upset stomach        Dose:  20 mg   Take 1 capsule (20 mg) by mouth 2 times daily   Quantity:  90 capsule   Refills:  0            Where to get your medicines      These medications [facility] [address], [place] [address]     Phone:  [phone]     furosemide 40 MG tablet                Primary Care Provider Office Phone # Fax #    [place] SHAAN Valencia PA-C [phone]       [address]  [facility] [address]        Thank you!     Thank you for choosing [facility] PHYSICIANS HEART AT [facility]  for your care. Our goal is always to provide you with excellent care. Hearing back from our patients is one way we can continue to improve our services. Please take a few minutes to complete the written survey that you may receive in the mail after your visit with us. Thank you!             Your Updated Medication List - Protect others around you: Learn how to safely use, store and throw away your medicines at www.disposemymeds.org.          This list is accurate  as of: 3/15/17 12:55 PM.  Always use your most recent med list.                   Brand Name Dispense Instructions for use    acidophilus Caps      Take 1 tablet by mouth daily       albuterol 108 (90 BASE) MCG/ACT Inhaler    PROAIR HFA/PROVENTIL HFA/VENTOLIN HFA    1 Inhaler    Inhale 2 puffs every 4-6 hours as needed for shortness of breath.       APIXABAN PO      Take 2.5 mg by mouth 2 times daily       blood glucose monitoring test strip    ACCU-CHEK DAT    100 each    Use to test blood sugars 3 times daily or as directed.       calcium carbonate 500 MG tablet    OS-RASTA 500 mg [place]. Ca    90 tablet    Take 1 tablet (500 mg) by mouth daily       carvedilol 3.125 MG tablet    COREG    180 tablet    Take 1 tablet (3.125 mg) by mouth 2 times daily (with meals)       [employer] NATURAL FISH OIL 1000 MG Caps     90 capsule    Take 1 g by mouth daily       cycloSPORINE modified capsule     120 capsule    Take 2 capsules (50 mg) by mouth every 12 hours       furosemide 40 MG tablet    LASIX    150 tablet    Take 2 tablets (80 mg) by mouth 3 times daily Take Lasix 80 mg twice daily and an addition 40 mg daily, as needed for weight gain and shortness of breath       gabapentin 100 MG capsule    NEURONTIN    90 capsule    Take 1 capsule (100 mg) by mouth daily       glipiZIDE 5 MG tablet    GLUCOTROL    30 tablet    Take 1 tablet (5 mg) by mouth 2 times daily (before meals)       hydroxypropyl methylcellulose 0.2 % Soln ophthalmic solution    GENTEAL    1 Bottle    Apply 1 drop to eye 4 times daily as needed Both eyes       insulin glargine 100 UNIT/ML injection    LANTUS     Inject Subcutaneous At Bedtime       isosorbide mononitrate 60 MG 24 hr tablet    IMDUR    90 tablet    Take 1 tablet (60 mg) by mouth daily       levETIRAcetam 500 MG tablet    KEPPRA    60 tablet    Take 1 tablet (500 mg) by mouth 2 times daily       loratadine 10 MG tablet    CLARITIN    30 tablet    Take 1 tablet (10 mg) by mouth three times a week  Take on Monday, Wednesday and Friday       mirtazapine 15 MG tablet    REMERON    30 tablet    Take 1 tablet (15 mg) by mouth At Bedtime       mycophenolate capsule     180 capsule    Take 3 capsules (750 mg) by mouth every 12 hours       nitroglycerin 0.4 MG sublingual tablet    NITROSTAT    25 tablet    Place 1 tablet (0.4 mg) under the tongue every 5 minutes as needed for chest pain       omeprazole 20 MG CR capsule    priLOSEC    90 capsule    Take 1 capsule (20 mg) by mouth 2 times daily       order for DME     1 each    Equipment being ordered:   Glucose testing monitor with test strips and lancets. Tests 1 time per day.       polyethylene glycol Packet    MIRALAX/GLYCOLAX    7 packet    Take 17 g by mouth daily as needed for constipation       senna-docusate 8.6-50 MG per tablet    SENOKOT-S;PERICOLACE    30 tablet    Take 1 tablet by mouth 2 times daily as needed       sertraline 25 MG tablet    ZOLOFT    30 tablet    Take 1 tablet (25 mg) by mouth 2 times daily       simvastatin 10 MG tablet    ZOCOR    90 tablet    Take 1 tablet (10 mg) by mouth At Bedtime       sodium chloride 0.65 % nasal spray    OCEAN     Spray 1 spray into both nostrils every hour as needed for congestion       sulfamethoxazole-trimethoprim 400-80 MG per tablet    BACTRIM/SEPTRA    12 tablet    Take 1 tablet by mouth three times a week M,W,F for pcp prophylaxis       tamsulosin 0.4 MG capsule    FLOMAX    90 capsule    Take 1 capsule (0.4 mg) by mouth daily       VITAMIN B-12 IJ      Inject 1,000 mcg as directed every 30 days.

## 2017-03-17 NOTE — TELEPHONE ENCOUNTER
Reason for call:  Same Day Appointment  Reason for Call:  Same Day Appointment, Requested Provider: anyone in ER    PCP: Trinidad Valencia    Reason for visit: Strep?     Duration of symptoms: a couple days     Have you been treated for this in the past? No    Additional comments: Please call and advice    Can we leave a detailed message on this number? YES    Phone number patient can be reached at: Home number on file [phone] (home)    Best Time: anytime    Call taken on 3/17/2017 at 11:48 AM by Jackie Pastor

## 2017-03-17 NOTE — TELEPHONE ENCOUNTER
Ramos Oconnor is a 83 year old male who calls with concerns of cold symptoms.    NURSING ASSESSMENT:  Description:  Patient states woke today with a terrible sore throat. Entire throat hurts, hard to swallow, even roof of mouth is sore. Cough present with yellow phlegm. Nasal congestion/drainage is clear. Some sob present from time to time, even at rest. Patient states has an extensive history and needs to be seen. Denies fever, mouth sores, ear issues, nausea/vomiting, exposure to illness.   Onset/duration:  Today, 3/17/17  Precip. factors:  n/a  Associated symptoms:  See above  Improves/worsens symptoms:  n/a  Pain scale (0-10)   5/10  LMP/preg/breast feeding:  n/a  Last exam/Treatment:  9/1/2016  Allergies:   Allergies   Allergen Reactions     Benzodiazepines Other (See Comments)     lethargy     Lisinopril      Penicillins Swelling     Spinach Nausea and Vomiting       MEDICATIONS:   Taking medication(s) as prescribed? N/A  Taking over the counter medication(s?) N/A  Any medication side effects? Not Applicable    Any barriers to taking medication(s) as prescribed?  N/A  Medication(s) improving/managing symptoms?  N/A  Medication reconciliation completed: N/A      NURSING PLAN: Nursing advice to patient not able to work in. notified UC would be best. patient would like to see if can get into . number to clinic given to patient to call, otherwise will go to .    RECOMMENDED DISPOSITION:  Home care advice - see nursing plan. Patient wanting to be seen.   Will comply with recommendation: Yes  If further questions/concerns or if symptoms do not improve, worsen or new symptoms develop, call your PCP or [facility] as soon as possible.      Guideline used:  Telephone Triage Protocols for Nurses, Fourth Edition, Dea Green  Cold symptoms    NOTES:  Disposition was determined by the first positive assessment question, therefore all previous assessment questions were negative      Jazzmine Price RN

## 2017-03-29 NOTE — TELEPHONE ENCOUNTER
We received a call from the [facility] with a request for a new prescription for Furosemide (Lasix) 40 MG to be faxed.  They also requested a current renal panel and progress note.  Message routed to [facility]_Cardiology Pool.  DARRYL EstradaA

## 2017-04-04 NOTE — PROGRESS NOTES
Creatinine 2.42 / BUN 82. Recent baseline creatinine 1.0-2.2  - CHF / volume overload  - Two weeks ago furosemide dose increased to 80 mg bid plus an additional 40 mg daily as needed for shortness of breathby cardiology  - Furosemide dose now back to 40 mg bid  - Treatment of CHF and laboratory monitoring of BMP per cardiology

## 2017-04-07 NOTE — NURSING NOTE
Chief Complaint   Patient presents with     RECHECK     6 month follow up with Ramos and his lung nodules     Pete Ruby CMA at 10:35 AM on 4/7/2017
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
## 2017-04-07 NOTE — MR AVS SNAPSHOT
After Visit Summary   4/7/2017    Ramos Oconnor    MRN: [id number]           Patient Information     Date Of Birth          7/5/1933        Visit Information        Provider Department      4/7/2017 10:30 AM Eh Munoz MD [facility]        Today's Diagnoses     Lung nodule    -  1    ILD (interstitial lung disease) (H)           Follow-ups after your visit        Follow-up notes from your care team     Return in about 6 months (around 10/7/2017).      Your next 10 appointments already scheduled     Apr 18, 2017 11:30 AM CDT   Return Visit with Josue Wilkinson MD   [facility]    [address] [place] [id number]   [phone]            May 23, 2017  3:35 PM CDT   (Arrive by 3:05 PM)   Return Kidney Transplant with Vaughn Holley MD   [facility] ([facility])    [address]  [address] [facility] [id number] [phone]            Oct 06, 2017 10:00 AM CDT   (Arrive by 9:45 AM)   CT CHEST W/O CONTRAST with UCCT1   [facility] [place] [facility] ([facility])    [address]  [address] [facility] [id number] [phone]           Please bring any scans or X-rays taken at other hospitals, if similar tests were done. Also bring a list of your medicines, including vitamins, minerals and over-the-counter drugs. It is safest to leave personal items at home.  Be sure to tell your doctor:   If you have any allergies.   If there s any chance you are pregnant.   If you are breastfeeding.   If you have any special needs.  You do not need to do anything special to prepare.  Please wear loose clothing, such as a sweat suit or jogging clothes. Avoid snaps, zippers and other metal. We may ask you to undress and put on a hospital gown.            Oct 06, 2017 10:30 AM CDT   (Arrive by 10:15 AM)   Return Visit with Eh Restrepo  "MD Tammy   [facility] ([facility])    [address]  [address] [facility] [address] [phone]              Future tests that were ordered for you today     Open Future Orders        Priority Expected Expires Ordered    RESPIRATORY FLOW VOLUME LOOP Routine 10/7/2017 10/7/2017 4/7/2017    CT Chest w/o Contrast Routine 10/7/2017 10/7/2017 4/7/2017            Who to contact     If you have questions or need follow up information about today's clinic visit or your schedule please contact [facility] directly at [phone].  Normal or non-critical lab and imaging results will be communicated to you by [employer], letter or phone within 4 business days after the clinic has received the results. If you do not hear from us within 7 days, please contact the clinic through [employer] or phone. If you have a critical or abnormal lab result, we will notify you by phone as soon as possible.  Submit refill requests through [employer] or call your pharmacy and they will forward the refill request to us. Please allow 3 business days for your refill to be completed.          Additional Information About Your Visit        [employer] Information     [employer] gives you secure access to your electronic health record. If you see a primary care provider, you can also send messages to your care team and make appointments. If you have questions, please call your primary care clinic.  If you do not have a primary care provider, please call [phone] and they will assist you.        Care EveryWhere ID     This is your Care EveryWhere ID. This could be used by other organizations to access your [facility] medical records  [id number]        Your Vitals Were     Pulse Temperature Respirations Height Pulse Oximetry BMI (Body Mass Index)    76 97.6  F (36.4  C) (Oral) 18 1.676 m (5' 6\") 97% 32.6 kg/m2       Blood Pressure from Last 3 Encounters: "   04/07/17 121/71   03/15/17 124/77   02/01/17 (!) 89/61    Weight from Last 3 Encounters:   04/07/17 91.6 kg (202 lb)   03/15/17 90.7 kg (200 lb)   02/01/17 88.5 kg (195 lb)                 Today's Medication Changes          These changes are accurate as of: 4/7/17 11:44 AM.  If you have any questions, ask your nurse or doctor.               These medicines have changed or have updated prescriptions.        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)        Dose:  5 mg   Take 1 tablet (5 mg) by mouth 2 times daily (before meals)   Quantity:  30 tablet   Refills:  0       mirtazapine 15 MG tablet   Commonly known as:  REMERON   This may have changed:    - how much to take  - additional instructions   Used for:  Chronic pain syndrome        Dose:  15 mg   Take 1 tablet (15 mg) by mouth At Bedtime   Quantity:  30 tablet   Refills:  0       omeprazole 20 MG CR capsule   Commonly known as:  priLOSEC   This may have changed:    - how much to take  - additional instructions   Used for:  Upset stomach        Dose:  20 mg   Take 1 capsule (20 mg) by mouth 2 times daily   Quantity:  90 capsule   Refills:  0                Primary Care Provider Office Phone # Fax #    Trinidad Valencia PA-C [phone]       [facility] [address]  [facility] [address]        Thank you!     Thank you for choosing [facility]  for your care. Our goal is always to provide you with excellent care. Hearing back from our patients is one way we can continue to improve our services.  Please take a few minutes to complete the written survey that you may receive in the mail after your visit with us. Thank you!             Your Updated Medication List - Protect others around you: Learn how to safely use, store and throw away your medicines at www.disposemymeds.org.          This list is accurate as of: 4/7/17 11:44 AM.  Always use your most recent med list.                   Brand Name Dispense Instructions for use    acidophilus Caps      Take 1 tablet by mouth daily       albuterol 108 (90 BASE) MCG/ACT Inhaler    PROAIR HFA/PROVENTIL HFA/VENTOLIN HFA    1 Inhaler    Inhale 2 puffs every 4-6 hours as needed for shortness of breath.       APIXABAN PO      Take 2.5 mg by mouth 2 times daily       blood glucose monitoring test strip    ACCU-CHEK DAT    100 each    Use to test blood sugars 3 times daily or as directed.       calcium carbonate 500 MG tablet    OS-RASTA 500 mg [place]. [place]    90 tablet    Take 1 tablet (500 mg) by mouth daily       carvedilol 3.125 MG tablet    COREG    180 tablet    Take 1 tablet (3.125 mg) by mouth 2 times daily (with meals)       CVS NATURAL FISH OIL 1000 MG Caps     90 capsule    Take 1 g by mouth daily       cycloSPORINE modified capsule     120 capsule    Take 2 capsules (50 mg) by mouth every 12 hours       furosemide 40 MG tablet    LASIX    150 tablet    Take Lasix 80 mg (2 tablets) twice daily and an addition 40 mg (1 tablet) daily, as needed for weight gain and shortness of breath       gabapentin 100 MG capsule    NEURONTIN    90 capsule    Take 1 capsule (100 mg) by mouth daily       glipiZIDE 5 MG tablet    GLUCOTROL    30 tablet    Take 1 tablet (5 mg) by mouth 2 times daily (before meals)       hydroxypropyl methylcellulose 0.2 % Soln ophthalmic solution    GENTEAL    1 Bottle    Apply 1 drop to eye 4 times daily as needed Both eyes       insulin glargine 100 UNIT/ML injection    LANTUS     Inject Subcutaneous At Bedtime       isosorbide mononitrate 60 MG  24 hr tablet    IMDUR    90 tablet    Take 1 tablet (60 mg) by mouth daily       levETIRAcetam 500 MG tablet    KEPPRA    60 tablet    Take 1 tablet (500 mg) by mouth 2 times daily       loratadine 10 MG tablet    CLARITIN    30 tablet    Take 1 tablet (10 mg) by mouth three times a week Take on Monday, Wednesday and Friday       mirtazapine 15 MG tablet    REMERON    30 tablet    Take 1 tablet (15 mg) by mouth At Bedtime       mycophenolate capsule     180 capsule    Take 3 capsules (750 mg) by mouth every 12 hours       nitroglycerin 0.4 MG sublingual tablet    NITROSTAT    25 tablet    Place 1 tablet (0.4 mg) under the tongue every 5 minutes as needed for chest pain       omeprazole 20 MG CR capsule    priLOSEC    90 capsule    Take 1 capsule (20 mg) by mouth 2 times daily       order for DME     1 each    Equipment being ordered:   Glucose testing monitor with test strips and lancets. Tests 1 time per day.       polyethylene glycol Packet    MIRALAX/GLYCOLAX    7 packet    Take 17 g by mouth daily as needed for constipation       senna-docusate 8.6-50 MG per tablet    SENOKOT-S;PERICOLACE    30 tablet    Take 1 tablet by mouth 2 times daily as needed       sertraline 25 MG tablet    ZOLOFT    30 tablet    Take 1 tablet (25 mg) by mouth 2 times daily       simvastatin 10 MG tablet    ZOCOR    90 tablet    Take 1 tablet (10 mg) by mouth At Bedtime       sodium chloride 0.65 % nasal spray    OCEAN     Spray 1 spray into both nostrils every hour as needed for congestion       sulfamethoxazole-trimethoprim 400-80 MG per tablet    BACTRIM/SEPTRA    12 tablet    Take 1 tablet by mouth Every Mon, Wed, Fri Morning       tamsulosin 0.4 MG capsule    FLOMAX    90 capsule    Take 1 capsule (0.4 mg) by mouth daily       VITAMIN B-12 IJ      Inject 1,000 mcg as directed every 30 days.
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
## 2017-04-18 NOTE — PROGRESS NOTES
2017            Trinidad Valencia PA-C   [address], [address]   [place] [address]      RE:  Ramos Oconnor   MRN:  [id number]   :  1933      Dear Ms. BeltranDanielle:      It was a pleasure participating care of your patient, . Ramos Oconnor.  As you know, he is an 83-year-old gentleman who I see today for coronary artery disease, congestive heart failure and permanent atrial fibrillation.      His past medical history is significant for the followin.  Type 1 diabetes.   2.  Hypertension.   3.  End-stage renal insufficiency, status post kidney transplant in .  Current baseline GFR of 26 mm per minute as of 2017.   4.  Hiatal hernia.   5.  Hypercoagulable state, status post TIA/stroke in the past with left facial numbness.   6.  BPH.   7.  Seizure disorder.   8.  Prior history of alcohol abuse.      Coronary angiogram 2010 revealed the following:     Left main normal.   LAD, mild proximal and mid disease with prior stent patent in  with a 50% stenosis post-stent also identified.   Circumflex mild disease.   RCA normal.      The patient was admitted for diastolic heart failure in  and at that time had noted swelling in his belly first which became painful and distended.  His hands then swelled and finally his ankles in the end with significant weight gain.  Diuresis resulted in good resolution of symptoms.      He has also been followed for permanent atrial fibrillation for which he is on apixaban and medical therapy.  CHADS-VASc2 score of 6 for congestive heart failure, hypertension, age, diabetes and prior neurologic event.      I last saw him 10/18/2016, at that time he was doing adequately.  He presents today for continuing care.        He has been followed closely in the [facility] with Susy Montoya.  She last saw him 03/15/2017 and found him to be mildly volume up and recommended adding a 40 mg dose of  Lasix on an as-needed basis when his weight increased.  He presents today for continuing care.      Since his visit to [facility] he has been relatively stable.  I last saw him 10/18/2016.  He has not felt any new chest discomfort or left facial numbness radiating to his left neck and elbow with prior to his last revascularization and his belly.  He does not feel grossly distended but only very mildly so.  He has been taking his extra dose of Lasix when his weight at home reaches the 203-204 pound range.  His blood pressures are well controlled and he has not had any black or bloody stools or nosebleeds.      Patient otherwise feels relatively well and denies new chest pain or shortness of breath.  He denies PND, orthopnea, or gross lower extremity edema.  He denies palpitations, syncope or near-syncope.      In terms of his present medications, he is takin.  Lasix 80 twice daily with an additional 40 mg at night as needed for weight gain.   2.  Bactrim.   3.  CellCept.   4.  Apixaban 2.5 twice daily.   5.  Imdur 60 mg a day.   6.  Neoral.   7.  Zoloft.     8.  Omeprazole.      PHYSICAL EXAMINATION:     VITAL SIGNS:  Blood pressure is 121/71 with a pulse 76.  His weight is 202 pounds.   NECK:  Exam reveals normal jugular venous pressure; however, peripheral veins remain slightly engorged above cardiac level.   LUNGS:  Reveal coarse breath sounds, right greater than left, that improve with cough.   CARDIAC:  Reveals an irregularly irregular rhythm.  No gross gallop appreciated.   ABDOMEN:  Belly soft, nontender, obese.   EXTREMITIES:  Lower extremities are without significant edema.      Pharmacologic nuclear stress test 2015 reveals no evidence for stress-induced ischemia.      Echocardiogram 2015 reveals normal LV systolic function with mild MR.      LABORATORY DATA:  2017 potassium 4.6, GFR 26.        IMPRESSION:      Ramos is an 83-year-old gentleman who has multiple active cardiac issues:       1.  Coronary artery disease.      He gives a verbal history of having a stent placed in 2001 for left facial numbness radiating to his left neck and elbow.  He experienced good resolution of symptoms after revascularization.  He has atypical recurrent symptoms subsequently, however, pharmacologic nuclear stress test 03/25/2015 reveals no evidence for stress-induced ischemia.  We will continue to monitor his progress.      2.  Diastolic heart failure in the context of renal insufficiency with a baseline GFR of 26 mL/min as of 04/04/2017.      His current dry weight likely lies in the 195-198 pound range.  He currently stands at 201 pounds at home and is mildly volume up at present.      However, it is not likely he could tolerate aggressive additional diuresis at the present time due to his ongoing renal insufficiency.  He currently [facility] as well and takes an extra half dose of Lasix on an as needed basis.  This appears quite reasonable and I concur with Susy Montoya in the [facility] as to using this on an as needed basis.      3.  Hypertension.      Blood pressures appear well controlled.      4.  Permanent atrial fibrillation.      His CHADS-VASc2 score is 6 for congestive heart failure, hypertension, age, diabetes and prior neurologic event.  Continue on full anticoagulation and medical therapy.        PLAN:     1.  Continue present medications at present doses.     2.  He will continue to take an additional 40 mg of Lasix on an as needed basis when his weight gets to 203-204 pound range.  It is unlikely he will tolerate much more aggressive diuresis due to his ongoing renal insufficiency.      3.  He already has a followup visit with Susy Montoya in the [facility] upcoming, and he can see me in 6 months or earlier if needed.      Once again, it was a pleasure participating in the care of your patient, Mr. Ramos Oconnor.  Please feel free to contact me anytime if you have  questions regarding his care in the future.         Sincerely,      ALISIA COLBY MD             D: 2017 12:15   T: 2017 16:09   MT:       Name:     HAYDER GONSALEZ   MRN:      [id number]        Account:      [id number]   :      1933      Document: [id number]       cc: Trinidad Valencia PA-C

## 2017-04-18 NOTE — MR AVS SNAPSHOT
After Visit Summary   4/18/2017    Ramos Oconnor    MRN: [id number]           Patient Information     Date Of Birth          7/5/1933        Visit Information        Provider Department      4/18/2017 11:30 AM Josue Wilkinson MD [facility]        Today's Diagnoses     Atrial fibrillation, unspecified type (H)    -  1       Follow-ups after your visit        Your next 10 appointments already scheduled     May 23, 2017  3:35 PM CDT   (Arrive by 3:05 PM)   Return Kidney Transplant with Vaughn Holley MD   [facility] ([facility])    [address] [place] [id number] [phone]            Oct 06, 2017 10:00 AM CDT   (Arrive by 9:45 AM)   CT CHEST W/O CONTRAST with UCCT1   [facility] [place] [facility] ([facility])    [address] [place] [id number] [phone]           Please bring any scans or X-rays taken at other hospitals, if similar tests were done. Also bring a list of your medicines, including vitamins, minerals and over-the-counter drugs. It is safest to leave personal items at home.  Be sure to tell your doctor:   If you have any allergies.   If there s any chance you are pregnant.   If you are breastfeeding.   If you have any special needs.  You do not need to do anything special to prepare.  Please wear loose clothing, such as a sweat suit or jogging clothes. Avoid snaps, zippers and other metal. We may ask you to undress and put on a hospital gown.            Oct 06, 2017 10:30 AM CDT   (Arrive by 10:15 AM)   Return Visit with Eh Munoz MD   [facility] ([facility] [place])    [address] [place] [address] [phone]              Who to contact     If you have questions or need follow up information about today's clinic visit or your schedule please contact [facility] directly at [phone].  Normal or non-critical lab and imaging results will be communicated to you by Dynamixyzhart, letter or phone within 4 business days after the clinic has received the results. If you do not hear from us within 7 days, please contact the clinic through Dynamixyzhart or phone. If you have a critical or abnormal lab result, we will notify you by phone as soon as possible.  Submit refill requests through FoKo or call your pharmacy and they will forward the refill request to us. Please allow 3 business days for your refill to be completed.          Additional Information About Your Visit        FoKo Information     FoKo gives you secure access to your electronic health record. If you see a primary care provider, you can also send messages to your care team and make appointments. If you have questions, please call your primary care clinic.  If you do not have a primary care provider, please call [phone] and they will assist you.      FoKo is an electronic gateway that provides easy, online access to your medical records. With FoKo, you can request a clinic appointment, read your test results, renew a prescription or communicate with your care team.     To access your existing account, please contact your [facility] or call [phone] for assistance.        Care EveryWhere ID     This is your Care EveryWhere ID. This could be used by other organizations to access your [place] medical records  [id number]         Blood Pressure from Last 3 Encounters:   04/07/17 121/71   03/15/17 124/77   02/01/17 (!) 89/61    Weight from Last 3 Encounters:   04/07/17 91.6 kg (202 lb)   03/15/17 90.7 kg (200 lb)   02/01/17 88.5 kg (195 lb)              Today, you had the following     No orders found for display         Today's Medication Changes          These changes are accurate as of: 4/18/17 12:38 PM.  If you have any questions, ask your nurse or  doctor.               These medicines have changed or have updated prescriptions.        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)        Dose:  5 mg   Take 1 tablet (5 mg) by mouth 2 times daily (before meals)   Quantity:  30 tablet   Refills:  0       mirtazapine 15 MG tablet   Commonly known as:  REMERON   This may have changed:    - how much to take  - additional instructions   Used for:  Chronic pain syndrome        Dose:  15 mg   Take 1 tablet (15 mg) by mouth At Bedtime   Quantity:  30 tablet   Refills:  0       omeprazole 20 MG CR capsule   Commonly known as:  priLOSEC   This may have changed:    - how much to take  - additional instructions   Used for:  Upset stomach        Dose:  20 mg   Take 1 capsule (20 mg) by mouth 2 times daily   Quantity:  90 capsule   Refills:  0                Primary Care Provider Office Phone # Fax #    Trinidad SHAAN Valencia PA-C [phone]       [address] [address]        Thank you!     Thank you for choosing [facility]  for your care. Our goal is always to provide you with excellent care. Hearing back from our patients is one way we can continue to improve our services. Please take a few minutes to complete the written survey that you may receive in the mail after your visit with us. Thank you!             Your Updated Medication List - Protect others around you: Learn how to safely use, store and throw away your medicines at www.disposemymeds.org.          This list is accurate as of: 4/18/17 12:38 PM.   Always use your most recent med list.                   Brand Name Dispense Instructions for use    acidophilus Caps      Take 1 tablet by mouth daily       albuterol 108 (90 BASE) MCG/ACT Inhaler    PROAIR HFA/PROVENTIL HFA/VENTOLIN HFA    1 Inhaler    Inhale 2 puffs every 4-6 hours as needed for shortness of breath.       APIXABAN PO      Take 2.5 mg by mouth 2 times daily       blood glucose monitoring test strip    ACCU-CHEK DAT    100 each    Use to test blood sugars 3 times daily or as directed.       calcium carbonate 500 MG tablet    OS-RASTA 500 mg [place]. Ca    90 tablet    Take 1 tablet (500 mg) by mouth daily       carvedilol 3.125 MG tablet    COREG    180 tablet    Take 1 tablet (3.125 mg) by mouth 2 times daily (with meals)       [employer] NATURAL FISH OIL 1000 MG Caps     90 capsule    Take 1 g by mouth daily       cycloSPORINE modified capsule     120 capsule    Take 2 capsules (50 mg) by mouth every 12 hours       furosemide 40 MG tablet    LASIX    150 tablet    Take Lasix 80 mg (2 tablets) twice daily and an addition 40 mg (1 tablet) daily, as needed for weight gain and shortness of breath       gabapentin 100 MG capsule    NEURONTIN    90 capsule    Take 1 capsule (100 mg) by mouth daily       glipiZIDE 5 MG tablet    GLUCOTROL    30 tablet    Take 1 tablet (5 mg) by mouth 2 times daily (before meals)       hydroxypropyl methylcellulose 0.2 % Soln ophthalmic solution    GENTEAL    1 Bottle    Apply 1 drop to eye 4 times daily as needed Both eyes       insulin glargine 100 UNIT/ML injection    LANTUS     Inject Subcutaneous At Bedtime       isosorbide mononitrate 60 MG 24 hr tablet    IMDUR    90 tablet    Take 1 tablet (60 mg) by mouth daily       levETIRAcetam 500 MG tablet    KEPPRA    60 tablet    Take 1 tablet (500 mg) by mouth 2 times daily       loratadine 10 MG tablet    CLARITIN    30 tablet    Take 1 tablet (10 mg) by mouth three times a week Take on Monday, Wednesday and Friday        mirtazapine 15 MG tablet    REMERON    30 tablet    Take 1 tablet (15 mg) by mouth At Bedtime       mycophenolate capsule     180 capsule    Take 3 capsules (750 mg) by mouth every 12 hours       nitroglycerin 0.4 MG sublingual tablet    NITROSTAT    25 tablet    Place 1 tablet (0.4 mg) under the tongue every 5 minutes as needed for chest pain       omeprazole 20 MG CR capsule    priLOSEC    90 capsule    Take 1 capsule (20 mg) by mouth 2 times daily       order for DME     1 each    Equipment being ordered:   Glucose testing monitor with test strips and lancets. Tests 1 time per day.       polyethylene glycol Packet    MIRALAX/GLYCOLAX    7 packet    Take 17 g by mouth daily as needed for constipation       senna-docusate 8.6-50 MG per tablet    SENOKOT-S;PERICOLACE    30 tablet    Take 1 tablet by mouth 2 times daily as needed       sertraline 25 MG tablet    ZOLOFT    30 tablet    Take 1 tablet (25 mg) by mouth 2 times daily       simvastatin 10 MG tablet    ZOCOR    90 tablet    Take 1 tablet (10 mg) by mouth At Bedtime       sodium chloride 0.65 % nasal spray    OCEAN     Spray 1 spray into both nostrils every hour as needed for congestion       sulfamethoxazole-trimethoprim 400-80 MG per tablet    BACTRIM/SEPTRA    12 tablet    Take 1 tablet by mouth Every Mon, Wed, Fri Morning       tamsulosin 0.4 MG capsule    FLOMAX    90 capsule    Take 1 capsule (0.4 mg) by mouth daily       VITAMIN B-12 IJ      Inject 1,000 mcg as directed every 30 days.

## 2017-04-18 NOTE — NURSING NOTE
"Ramos Oconnor's goals for this visit include:   Chief Complaint   Patient presents with     RECHECK     6 month recheck       He requests these members of his care team be copied on today's visit information: PCP    PCP: Trinidad Valencia    Referring Provider:  No referring provider defined for this encounter.    Chief Complaint   Patient presents with     RECHECK     6 month recheck       Initial There were no vitals taken for this visit. Estimated body mass index is 32.6 kg/(m^2) as calculated from the following:    Height as of 4/7/17: 1.676 m (5' 6\").    Weight as of 4/7/17: 91.6 kg (202 lb).  Medication Reconciliation: complete     Medication refills: None    Laure Ibrahim CMA      "

## 2017-05-10 NOTE — PROGRESS NOTES
Notes Recorded by Vaughn Holley MD on 5/7/2017 at 7:42 AM  Slightly increased serum creatinine, otherwise, labs are normal or unchanged.  Would make no changes or interventions at this time, but follow labs closely.  ------

## 2017-05-17 NOTE — TELEPHONE ENCOUNTER
"Patient called has some questions about his weight has been going up and down 1-2 pounds even with meds. She will give the extra dose and it will come down but the next day it will be back up again. She would like to know what else to do with his water pill to help keep his weight from bouncing up and down.        Per Susy Montoya, APRN-CNP:    \"Please let them know that we expect his weight to vary a couple of pounds in response to added diuretics. He doesn't need to take the prn dose unless his weight has trended up on 2 consecutive days. Then you would expect it to come down over the next day or two.   If he is needing the prn dose every other day, then just plan on taking it every other day\".     Patient and his wife Kaylyn, contacted and discussed message above. They verbalize understanding and will comply.    Olivia Macdonald CMA.    "

## 2017-05-23 NOTE — MR AVS SNAPSHOT
After Visit Summary   5/23/2017    Ramos Oconnor    MRN: [id number]           Patient Information     Date Of Birth          7/5/1933        Visit Information        Provider Department      5/23/2017 3:35 PM Vaughn Holley MD [facility]        Today's Diagnoses     Secondary renal hyperparathyroidism (H)    -  1       Follow-ups after your visit        Follow-up notes from your care team     Return in about 6 months (around 11/23/2017).      Your next 10 appointments already scheduled     Jul 03, 2017  8:00 AM CDT   LAB with NL LAB [facility] ([facility])    [address] [facility] [address]   [phone]           Patient must bring picture ID.  Patient should be prepared to give a urine specimen  Please do not eat 10-12 hours before your appointment if you are coming in fasting for labs on lipids, cholesterol, or glucose (sugar).  Pregnant women should follow their Care Team instructions. Water with medications is okay. Do not drink coffee or other fluids.   If you have concerns about taking  your medications, please ask at office or if scheduling via [employer] send a message by clicking on Secure Messaging, Message Your Care Team.            Jul 06, 2017 12:00 PM CDT   Core Return with Olive Huerta NP   [facility] ([facility])    [address] [place] [id number] [phone]            Oct 06, 2017 10:00 AM CDT   (Arrive by 9:45 AM)   CT CHEST W/O CONTRAST with UCCT1   [facility] ([facility])    [address] [address] [phone]           Please bring any scans or X-rays taken at other hospitals, if similar tests were done. Also bring a list of your medicines, including vitamins, minerals and over-the-counter drugs. It is safest to leave personal items at home.  Be sure to  tell your doctor:   If you have any allergies.   If there s any chance you are pregnant.   If you are breastfeeding.   If you have any special needs.  You do not need to do anything special to prepare.  Please wear loose clothing, such as a sweat suit or jogging clothes. Avoid snaps, zippers and other metal. We may ask you to undress and put on a hospital gown.            Oct 06, 2017 10:30 AM CDT   (Arrive by 10:15 AM)   Return Visit with Eh Munoz MD   [facility] ([facility])    [address] [place] [id number] [phone]            Dec 05, 2017  3:10 PM CST   (Arrive by 2:40 PM)   Return Kidney Transplant with Vaughn Holley MD   [facility] ([facility])    [address] [place] [id number] [phone]              Future tests that were ordered for you today     Open Future Orders        Priority Expected Expires Ordered    Parathyroid Hormone Intact Routine  6/22/2017 5/23/2017    Vitamin D Deficiency Routine  6/22/2017 5/23/2017            Who to contact     If you have questions or need follow up information about today's clinic visit or your schedule please contact [facility] directly at [phone].  Normal or non-critical lab and imaging results will be communicated to you by [email or web address], letter or phone within 4 business days after the clinic has received the results. If you do not hear from us within 7 days, please contact the clinic through [email or web address] or phone. If you have a critical or abnormal lab result, we will notify you by phone as soon as possible.  Submit refill requests through Digi[employer] or call your pharmacy and they will forward the refill request to us. Please allow 3 business days for your refill to be completed.          Additional Information About Your Visit        DigiFit Information     [employer] gives you secure access to  "your electronic health record. If you see a primary care provider, you can also send messages to your care team and make appointments. If you have questions, please call your primary care clinic.  If you do not have a primary care provider, please call [phone] and they will assist you.        Care EveryWhere ID     This is your Care EveryWhere ID. This could be used by other organizations to access your [place] medical records  [id number]        Your Vitals Were     Pulse Temperature Height Pulse Oximetry BMI (Body Mass Index)       130 98.5  F (36.9  C) (Oral) 1.676 m (5' 6\") 91% 32.89 kg/m2        Blood Pressure from Last 3 Encounters:   05/23/17 120/66   04/07/17 121/71   03/15/17 124/77    Weight from Last 3 Encounters:   05/23/17 92.4 kg (203 lb 12.8 oz)   04/07/17 91.6 kg (202 lb)   03/15/17 90.7 kg (200 lb)                 Today's Medication Changes          These changes are accurate as of: 5/23/17  3:44 PM.  If you have any questions, ask your nurse or doctor.               These medicines have changed or have updated prescriptions.        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)        Dose:  5 mg   Take 1 tablet (5 mg) by mouth 2 times daily (before meals)   Quantity:  30 tablet   Refills:  0       mirtazapine 15 MG tablet   Commonly known as:  REMERON   This may have changed:    - how much to take  - additional instructions   Used for:  Chronic pain syndrome        Dose:  15 mg   Take 1 tablet (15 mg) by mouth At Bedtime   Quantity:  30 tablet   Refills:  0       omeprazole 20 MG CR capsule   Commonly known as:  " priLOSEC   This may have changed:    - how much to take  - additional instructions   Used for:  Upset stomach        Dose:  20 mg   Take 1 capsule (20 mg) by mouth 2 times daily   Quantity:  90 capsule   Refills:  0                Primary Care Provider Office Phone # Fax #    Trinidad SHAAN Valencia PA-C [phone]       [facility] [address] [facility] [address]  [facility] [address]        Thank you!     Thank you for choosing [facility]  for your care. Our goal is always to provide you with excellent care. Hearing back from our patients is one way we can continue to improve our services. Please take a few minutes to complete the written survey that you may receive in the mail after your visit with us. Thank you!             Your Updated Medication List - Protect others around you: Learn how to safely use, store and throw away your medicines at www.disposemymeds.org.          This list is accurate as of: 5/23/17  3:44 PM.  Always use your most recent med list.                   Brand Name Dispense Instructions for use    acidophilus Caps      Take 1 tablet by mouth daily       albuterol 108 (90 BASE) MCG/ACT Inhaler    PROAIR HFA/PROVENTIL HFA/VENTOLIN HFA    1 Inhaler    Inhale 2 puffs every 4-6 hours as needed for shortness of breath.       APIXABAN PO      Take 2.5 mg by mouth 2 times daily       blood glucose monitoring test strip    ACCU-CHEK DAT    100 each    Use to test blood sugars 3 times daily or as directed.       calcium carbonate 500 MG tablet    OS-RASTA 500 mg [place]. Ca    90 tablet    Take 1 tablet (500 mg) by mouth daily       carvedilol 3.125 MG tablet    COREG    180 tablet    Take 1 tablet (3.125 mg) by mouth 2 times daily (with meals)       CVS NATURAL FISH OIL 1000 MG Caps     90 capsule    Take 1 g by mouth daily       cycloSPORINE modified capsule     120 capsule    Take 2 capsules (50 mg) by mouth every 12 hours       furosemide 40 MG tablet    LASIX     150 tablet    Take Lasix 80 mg (2 tablets) twice daily and an addition 40 mg (1 tablet) daily, as needed for weight gain and shortness of breath       gabapentin 100 MG capsule    NEURONTIN    90 capsule    Take 1 capsule (100 mg) by mouth daily       glipiZIDE 5 MG tablet    GLUCOTROL    30 tablet    Take 1 tablet (5 mg) by mouth 2 times daily (before meals)       hydroxypropyl methylcellulose 0.2 % Soln ophthalmic solution    GENTEAL    1 Bottle    Apply 1 drop to eye 4 times daily as needed Both eyes       insulin glargine 100 UNIT/ML injection    LANTUS     Inject Subcutaneous At Bedtime       isosorbide mononitrate 60 MG 24 hr tablet    IMDUR    90 tablet    Take 1 tablet (60 mg) by mouth daily       levETIRAcetam 500 MG tablet    KEPPRA    60 tablet    Take 1 tablet (500 mg) by mouth 2 times daily       loratadine 10 MG tablet    CLARITIN    30 tablet    Take 1 tablet (10 mg) by mouth three times a week Take on Monday, Wednesday and Friday       mirtazapine 15 MG tablet    REMERON    30 tablet    Take 1 tablet (15 mg) by mouth At Bedtime       mycophenolate 250 MG capsule    CELLCEPT - GENERIC EQUIVALENT    180 capsule    Take 3 capsules (750 mg) by mouth 2 times daily       nitroglycerin 0.4 MG sublingual tablet    NITROSTAT    25 tablet    Place 1 tablet (0.4 mg) under the tongue every 5 minutes as needed for chest pain       omeprazole 20 MG CR capsule    priLOSEC    90 capsule    Take 1 capsule (20 mg) by mouth 2 times daily       order for DME     1 each    Equipment being ordered:   Glucose testing monitor with test strips and lancets. Tests 1 time per day.       polyethylene glycol Packet    MIRALAX/GLYCOLAX    7 packet    Take 17 g by mouth daily as needed for constipation       senna-docusate 8.6-50 MG per tablet    SENOKOT-S;PERICOLACE    30 tablet    Take 1 tablet by mouth 2 times daily as needed       sertraline 25 MG tablet    ZOLOFT    30 tablet    Take 1 tablet (25 mg) by mouth 2 times daily        simvastatin 10 MG tablet    ZOCOR    90 tablet    Take 1 tablet (10 mg) by mouth At Bedtime       sodium chloride 0.65 % nasal spray    OCEAN     Spray 1 spray into both nostrils every hour as needed for congestion       sulfamethoxazole-trimethoprim 400-80 MG per tablet    BACTRIM/SEPTRA    12 tablet    Take 1 tablet by mouth Every Mon, Wed, Fri Morning       tamsulosin 0.4 MG capsule    FLOMAX    90 capsule    Take 1 capsule (0.4 mg) by mouth daily       VITAMIN B-12 IJ      Inject 1,000 mcg as directed every 30 days.
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
## 2017-05-23 NOTE — NURSING NOTE
"Chief Complaint   Patient presents with     RECHECK     Kidney follow up       Initial /66 (BP Location: Right arm, Patient Position: Chair, Cuff Size: Adult Large)  Pulse 130  Temp 98.5  F (36.9  C) (Oral)  Ht 1.676 m (5' 6\")  Wt 92.4 kg (203 lb 12.8 oz)  SpO2 91%  BMI 32.89 kg/m2 Estimated body mass index is 32.89 kg/(m^2) as calculated from the following:    Height as of this encounter: 1.676 m (5' 6\").    Weight as of this encounter: 92.4 kg (203 lb 12.8 oz).  Medication Reconciliation: complete   RUBY HOLMAN CMA      "
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
## 2017-05-25 PROBLEM — E55.9 VITAMIN D DEFICIENCY: Status: ACTIVE | Noted: 2017-01-01

## 2017-05-25 PROBLEM — N25.81 SECONDARY RENAL HYPERPARATHYROIDISM (H): Status: ACTIVE | Noted: 2017-01-01
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
## 2017-07-06 NOTE — NURSING NOTE
"Chief Complaint   Patient presents with     Follow Up For     83 year old CORe return; Chronic diastolic HF.  Patient reports SOB.  He also notes chest pain on occasion, but also states he has heartburn.         Initial BP 97/58 (BP Location: Right arm, Patient Position: Chair, Cuff Size: Adult Large)  Pulse 64  Wt 95.3 kg (210 lb)  SpO2 94%  BMI 33.89 kg/m2 Estimated body mass index is 33.89 kg/(m^2) as calculated from the following:    Height as of 5/23/17: 1.676 m (5' 6\").    Weight as of this encounter: 95.3 kg (210 lb)..  BP completed using cuff size: YOLANDA Suero.        "
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
## 2017-07-06 NOTE — NURSING NOTE
Diet: Patient instructed regarding a heart failure healthy diet, including discussion of reduced fat and 2000 mg daily sodium restriction, daily weights, medication purpose and compliance, fluid restrictions and resources for patient and family to access for assistance with heart failure management.       Labs: Patient was given results of the laboratory testing obtained today and patient was instructed about when to return for the next laboratory testing.    Med Reconcile: Reviewed and verified all current medications with the patient. The updated medication list was printed and given to the patient.    Return Appointment: Patient given instructions regarding scheduling next clinic visit.     Patient stated that he understood all health information given and agreed to call with further questions or concerns.    Patient attended clinic with his spouse Humaira.  He is volume up and is to take an extra 40 mg of lasix today and tomorrow along with consuming potassium-rich foods.  Patient and spouse state that they are very aware of these resources. There were no other medication changes made.  He will call us with weight status if he fails to improve.  He will return to [facility] in November.

## 2017-07-06 NOTE — PATIENT INSTRUCTIONS
Thank you for coming to the [facility] @ [facility];  please note the following instructions:    1.  Please take an extra dose of Lasix 40 mg today and tomorrow.     2.  Please some extra potassium rich foods today and tomorrow    3.  Please return to clinic in November or as needed (We will call to schedule your appointment)    Results for HAYDER GONSALEZ (MRN [id number]) as of 7/6/2017 12:14   Ref. Range 7/3/2017 09:54   Sodium Latest Ref Range: 133 - 144 mmol/L 140   Potassium Latest Ref Range: 3.4 - 5.3 mmol/L 4.4   Chloride Latest Ref Range: 94 - 109 mmol/L 104   Carbon Dioxide Latest Ref Range: 20 - 32 mmol/L 28   Urea Nitrogen Latest Ref Range: 7 - 30 mg/dL 47 (H)   Creatinine Latest Ref Range: 0.66 - 1.25 mg/dL 2.10 (H)   GFR Estimate Latest Ref Range: >60 mL/min/1.7m2 30 (L)   GFR Estimate If Black Latest Ref Range: >60 mL/min/1.7m2 37 (L)   Calcium Latest Ref Range: 8.5 - 10.1 mg/dL 8.5   Anion Gap Latest Ref Range: 3 - 14 mmol/L 8   Glucose Latest Ref Range: 70 - 99 mg/dL 117 (H)   INR Latest Ref Range: 0.86 - 1.14  0.99     __________________________________________________________    Please continue to follow a low salt diet, limit your water intake to 2 liters per day and continue to exercise. 2 Liters a day = 8.5 cups, or 72 ounces.    If you gain 2# in 24 hours or 5# in one week call Ricky Oh RN so we can adjust your medications as needed over the phone.    [facility] - [place] Location    If you have any questions regarding to your visit please contact your care team:     Cardiology  Telephone Number   Olive Huerta, JUAN KIRKPATRICK Cardiology RN   [phone]; option 1 and then option 3    *After hours: [phone]   For scheduling appointments @ [place] :     [phone] or    [phone] (select option 1)    *After hours: [phone]           If you need a medication refill please contact your pharmacy.  Please allow 3 business days for your refill to be  completed.    As always, Thank you for trusting us with your health care needs!  __________________________________________________________  [facility] Cardiomyopathy, Optimization, Rehabilitation, Education    The [facility] is a heart failure specialty clinic within the [facility] where you will work with specialized nurse practioners dedicated to helping patients with heart failure carefully adjust medications, receive education, and learn who and when to call if symptoms develop.  They specialize in helping you better understand your condition, slow the progression of your disease, improve the length and quality of your life, help you detect future heart problems before they become life threatening, and avoid hospitalizations.

## 2017-07-06 NOTE — MR AVS SNAPSHOT
After Visit Summary   7/6/2017    Hayder Oconnor    MRN: [id number]           Patient Information     Date Of Birth          7/5/1933        Visit Information        Provider Department      7/6/2017 12:00 PM Susy Montoya NP [facility] HEART AT [facility] Instructions    Thank you for coming to the [facility] Heart @ [facility];  please note the following instructions:    1.  Please take an extra dose of Lasix 40 mg today and tomorrow.     2.  Please some extra potassium rich foods today and tomorrow    3.  Please return to clinic in November or as needed (We will call to schedule your appointment)    Results for HAYDER OCONNOR (MRN [id number]) as of 7/6/2017 12:14   Ref. Range 7/3/2017 09:54   Sodium Latest Ref Range: 133 - 144 mmol/L 140   Potassium Latest Ref Range: 3.4 - 5.3 mmol/L 4.4   Chloride Latest Ref Range: 94 - 109 mmol/L 104   Carbon Dioxide Latest Ref Range: 20 - 32 mmol/L 28   Urea Nitrogen Latest Ref Range: 7 - 30 mg/dL 47 (H)   Creatinine Latest Ref Range: 0.66 - 1.25 mg/dL 2.10 (H)   GFR Estimate Latest Ref Range: >60 mL/min/1.7m2 30 (L)   GFR Estimate If Black Latest Ref Range: >60 mL/min/1.7m2 37 (L)   Calcium Latest Ref Range: 8.5 - 10.1 mg/dL 8.5   Anion Gap Latest Ref Range: 3 - 14 mmol/L 8   Glucose Latest Ref Range: 70 - 99 mg/dL 117 (H)   INR Latest Ref Range: 0.86 - 1.14  0.99     __________________________________________________________    Please continue to follow a low salt diet, limit your water intake to 2 liters per day and continue to exercise. 2 Liters a day = 8.5 cups, or 72 ounces.    If you gain 2# in 24 hours or 5# in one week call Ricky Oh RN so we can adjust your medications as needed over the phone.    [facility] Cardiology - [place] Location    If you have any questions regarding to your visit please contact your care team:     Cardiology  Telephone Number   JUAN Cooper, Cardiology  RN   [phone]; option 1 and then option 3    *After hours: [phone]   For scheduling appointments @ [place] :     [phone] or    [phone] (select option 1)    *After hours: [phone]           If you need a medication refill please contact your pharmacy.  Please allow 3 business days for your refill to be completed.    As always, Thank you for trusting us with your health care needs!  __________________________________________________________  C.O.R.E. CLINIC Cardiomyopathy, Optimization, Rehabilitation, Education    The C.O.R.E. CLINIC is a heart failure specialty clinic within the [facility] where you will work with specialized nurse practioners dedicated to helping patients with heart failure carefully adjust medications, receive education, and learn who and when to call if symptoms develop.  They specialize in helping you better understand your condition, slow the progression of your disease, improve the length and quality of your life, help you detect future heart problems before they become life threatening, and avoid hospitalizations.                Follow-ups after your visit        Your next 10 appointments already scheduled     Oct 06, 2017 10:00 AM CDT   (Arrive by 9:45 AM)   CT CHEST W/O CONTRAST with UCCT1   [facility] ([facility])    [address] [address] [phone]           Please bring any scans or X-rays taken at other hospitals, if similar tests were done. Also bring a list of your medicines, including vitamins, minerals and over-the-counter drugs. It is safest to leave personal items at home.  Be sure to tell your doctor:   If you have any allergies.   If there s any chance you are pregnant.   If you are breastfeeding.   If you have any special needs.  You do not need to do anything special to prepare.  Please wear loose clothing, such as a sweat suit or  jogging clothes. Avoid snaps, zippers and other metal. We may ask you to undress and put on a hospital gown.            Oct 06, 2017 10:30 AM CDT   (Arrive by 10:15 AM)   Return Visit with Eh Munoz MD   [facility] ([facility])    [address] [facility] [id number] [phone]            Dec 05, 2017  3:10 PM CST   (Arrive by 2:40 PM)   Return Kidney Transplant with Vaughn Holley MD   [facility] ([facility])    [address]  [address] [facility] [id number] [phone]              Who to contact     If you have questions or need follow up information about today's clinic visit or your schedule please contact [facility]YS[facility]ANS [facility] AT [facility] directly at [phone].  Normal or non-critical lab and imaging results will be communicated to you by MyChart, letter or phone within 4 business days after the clinic has received the results. If you do not hear from us within 7 days, please contact the clinic through [employer] or phone. If you have a critical or abnormal lab result, we will notify you by phone as soon as possible.  Submit refill requests through [employer] or call your pharmacy and they will forward the refill request to us. Please allow 3 business days for your refill to be completed.          Additional Information About Your Visit        MyChart Information     [employer] gives you secure access to your electronic health record. If you see a primary care provider, you can also send messages to your care team and make appointments. If you have questions, please call your primary care clinic.  If you do not have a primary care provider, please call [phone] and they will assist you.        Care EveryWhere ID     This is your Care EveryWhere ID. This could be used by other organizations to access your [facility]  records  [id number]        Your Vitals Were     Pulse Pulse Oximetry BMI (Body Mass Index)             64 94% 33.89 kg/m2          Blood Pressure from Last 3 Encounters:   07/06/17 97/58   05/23/17 120/66   04/07/17 121/71    Weight from Last 3 Encounters:   07/06/17 95.3 kg (210 lb)   05/23/17 92.4 kg (203 lb 12.8 oz)   04/07/17 91.6 kg (202 lb)              Today, you had the following     No orders found for display         Today's Medication Changes          These changes are accurate as of: 7/6/17 12:31 PM.  If you have any questions, ask your nurse or doctor.               These medicines have changed or have updated prescriptions.        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)        Dose:  5 mg   Take 1 tablet (5 mg) by mouth 2 times daily (before meals)   Quantity:  30 tablet   Refills:  0       mirtazapine 15 MG tablet   Commonly known as:  REMERON   This may have changed:    - how much to take  - additional instructions   Used for:  Chronic pain syndrome        Dose:  15 mg   Take 1 tablet (15 mg) by mouth At Bedtime   Quantity:  30 tablet   Refills:  0       omeprazole 20 MG CR capsule   Commonly known as:  priLOSEC   This may have changed:    - how much to take  - additional instructions   Used for:  Upset stomach        Dose:  20 mg   Take 1 capsule (20 mg) by mouth 2 times daily   Quantity:  90 capsule   Refills:  0                Primary Care Provider Office Phone # Fax #    Trinidad Valencia PA-C [phone]       [facility] [address]  [facility] [address]         Equal Access to Services     [facility] : Hadii barry taylor robe Nelson, wairenada luqadaha, qaanamikata katraceyjaneth martin. So [facility] [place] [phone].    ATENCIÓN: Si habla marquez, tiene a sharma disposición servicios gratuitos de asistencia lingüística. Almaame al [phone].    We comply with applicable federal civil rights laws and [place] laws. We do not discriminate on the basis of race, color, national origin, age, disability sex, sexual orientation or gender identity.            Thank you!     Thank you for choosing [facility]YS[facility] HEART AT [facility]  for your care. Our goal is always to provide you with excellent care. Hearing back from our patients is one way we can continue to improve our services. Please take a few minutes to complete the written survey that you may receive in the mail after your visit with us. Thank you!             Your Updated Medication List - Protect others around you: Learn how to safely use, store and throw away your medicines at www.disposemymeds.org.          This list is accurate as of: 7/6/17 12:31 PM.  Always use your most recent med list.                   Brand Name Dispense Instructions for use Diagnosis    acidophilus Caps      Take 1 tablet by mouth daily    Chronic pain syndrome       albuterol 108 (90 BASE) MCG/ACT Inhaler    PROAIR HFA/PROVENTIL HFA/VENTOLIN HFA    1 Inhaler    Inhale 2 puffs every 4-6 hours as needed for shortness of breath.    Allergic state, initial encounter       APIXABAN PO      Take 2.5 mg by mouth 2 times daily        blood glucose monitoring test strip    ACCU-CHEK DAT    100 each    Use to test blood sugars 3 times daily or as directed.    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), Type 2 diabetes mellitus with diabetic nephropathy (H)       calcium carbonate 1250 MG tablet    OS-RASTA 500 mg [place]. Ca    90 tablet    Take 1 tablet (500 mg) by mouth daily    Pneumonia,  organism unspecified, unspecified laterality, unspecified part of lung       carvedilol 3.125 MG tablet    COREG    180 tablet    Take 1 tablet (3.125 mg) by mouth 2 times daily (with meals)    Chronic atrial fibrillation (H)       [employer] NATURAL FISH OIL 1000 MG Caps     90 capsule    Take 1 g by mouth daily    Hyperlipidemia LDL goal <100       cycloSPORINE modified capsule     120 capsule    Take 2 capsules (50 mg) by mouth every 12 hours    Organ transplant       furosemide 40 MG tablet    LASIX    150 tablet    Take Lasix 80 mg (2 tablets) twice daily and an addition 40 mg (1 tablet) daily, as needed for weight gain and shortness of breath    Chronic diastolic heart failure (H), Chronic diastolic heart failure (H)       gabapentin 100 MG capsule    NEURONTIN    90 capsule    Take 1 capsule (100 mg) by mouth daily    Chronic pain syndrome       glipiZIDE 5 MG tablet    GLUCOTROL    30 tablet    Take 1 tablet (5 mg) by mouth 2 times daily (before meals)    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)       hydroxypropyl methylcellulose 0.2 % Soln ophthalmic solution    GENTEAL    1 Bottle    Apply 1 drop to eye 4 times daily as needed Both eyes    Allergic state, initial encounter       insulin glargine 100 UNIT/ML injection    LANTUS     Inject Subcutaneous At Bedtime        isosorbide mononitrate 60 MG 24 hr tablet    IMDUR    90 tablet    Take 1 tablet (60 mg) by mouth daily    Chronic atrial fibrillation (H)       levETIRAcetam 500 MG tablet    KEPPRA    60 tablet    Take 1 tablet (500 mg) by mouth 2 times daily    Sensory seizure (H)       loratadine 10 MG tablet    CLARITIN    30 tablet    Take 1 tablet (10 mg) by mouth three times a week Take on Monday, Wednesday and Friday    Allergic state, initial encounter       mirtazapine 15 MG tablet    REMERON    30 tablet    Take 1 tablet (15 mg) by mouth At Bedtime    Chronic pain syndrome       mycophenolate 250 MG capsule    CELLCEPT - GENERIC  EQUIVALENT    180 capsule    Take 3 capsules (750 mg) by mouth 2 times daily    Kidney replaced by transplant       nitroGLYcerin 0.4 MG sublingual tablet    NITROSTAT    25 tablet    Place 1 tablet (0.4 mg) under the tongue every 5 minutes as needed for chest pain    Chronic atrial fibrillation (H)       omeprazole 20 MG CR capsule    priLOSEC    90 capsule    Take 1 capsule (20 mg) by mouth 2 times daily    Upset stomach       order for DME     1 each    Equipment being ordered:   Glucose testing monitor with test strips and lancets. Tests 1 time per day.    Type 2 diabetes, HbA1c goal < 7% (H)       polyethylene glycol Packet    MIRALAX/GLYCOLAX    7 packet    Take 17 g by mouth daily as needed for constipation    Slow transit constipation       senna-docusate 8.6-50 MG per tablet    SENOKOT-S;PERICOLACE    30 tablet    Take 1 tablet by mouth 2 times daily as needed    Slow transit constipation       sertraline 25 MG tablet    ZOLOFT    30 tablet    Take 1 tablet (25 mg) by mouth 2 times daily    Chronic pain syndrome       simvastatin 10 MG tablet    ZOCOR    90 tablet    Take 1 tablet (10 mg) by mouth At Bedtime    Hyperlipidemia LDL goal <100       sodium chloride 0.65 % nasal spray    OCEAN     Spray 1 spray into both nostrils every hour as needed for congestion    Allergic state, initial encounter       sulfamethoxazole-trimethoprim 400-80 MG per tablet    BACTRIM/SEPTRA    12 tablet    Take 1 tablet by mouth Every Mon, Wed, Fri Morning    Kidney replaced by transplant       tamsulosin 0.4 MG capsule    FLOMAX    90 capsule    Take 1 capsule (0.4 mg) by mouth daily    Urinary retention       VITAMIN B-12 IJ      Inject 1,000 mcg as directed every 30 days.

## 2017-08-31 NOTE — TELEPHONE ENCOUNTER
"Called pt to schedule follow up appointment with Dr. Wilkinson in October. Pt handed phone to wife. She scheduled appointment. She states they are concerned about pt's weights. She states on Tuesday 8/29 pt weighed 212 lbs and today 8/31 he weighs 215 lbs. She states she Is currently giving pt furosemide 80 mg twice daily and an additional 40 mg every other day. She states this was working to control the water weight recently but now he is \"out of control\". Pt's wife is wondering what they should do next. Informed her I would send to Dr. Wilkinson and call back with any new recommendations.  Angela Sullivan CMA      "

## 2017-08-31 NOTE — TELEPHONE ENCOUNTER
Called and spoke to wife. She noticed his weight creeping up and started to add an extra 40 mg of lasix every other day to his daily dose of 80 mg twice a day. She believes his dry weight might be 208#. He is now at 215 # . He is short of breath walking and sitting. He does not have edema, only if he is bad enough to go to the hospital. Abdomen does appear swollen. Sleeps with one pillow on side,no elevation of the bed.  No cough, does have some wheezing. He has never taken another agent for diuresing. Confirmed he is taking lasix 80 mg twice a day plus an additional 40 mg every other day, carvedilol 3.125 mg twice a day, and Imdur 60 mg daily.   Will route message to Dr Wilkinson for recommendations. She was instructed that if his condition worsens or he feels unsafe, they will have to go to the ED or call 911. [phone] cell phone for wife

## 2017-09-05 NOTE — PROGRESS NOTES
I called and spoke with Minnie, Ramos's SO.  She states Ramos's weight is still elevated, 214.6lbs today.  Complains of SOB with activity, increased from baseline.  His normal/dry weight should be 208-210lbs.  Has been 215 since last Thursday.  Ramos normally takes 80mg BID and since Sunday has also been taking an extra 40mg in the afternoon.  Discussed with Earline Askew NP then called Minnie back.     Date: 9/5/2017  Time of Call: 5:12 PM  Diagnosis:  Heart failure  [facility] - Ordering provider: JERSON Castillo   Order: Increase lasix to 80mg TID. Phone check on Friday.  Order received by: Sidra Oh RN   Follow-up/additional notes:

## 2017-09-06 NOTE — TELEPHONE ENCOUNTER
Message sent to Dr Wilkinson on 9/1/17. He has no openings next week and would like to try and get patient into the [facility] clinic. Spoke to Judy who will send a message asking for recommendations from the providers/nurses in the [facility] clinic.

## 2017-09-08 NOTE — PROGRESS NOTES
"I called and spoke with Ramos's SO, Humaira.  She states Ramos's weight \"won't budge much.\" Reports his weiht today is 214.2, down from 215lbs. States he's still SOB with activity, \"a little more than baseline.\" Confirms he is still taking lasix 80mg TID.  Agrees continue lasix 80mg TID and have labs checked on Tuesday.  Also scheduled a Return [facility] appt on 9/21/17 in [place], with labs prior.  I will call and check in with Ramos again after seeing lab results on Tuesday.     Date: 9/8/2017  Time of Call: 4:25 PM  Diagnosis:  Heart failure  [facility] - Ordering provider: Susy Montoya NP   Order: BMP Tuesday  Order received by: Sidra Oh RN   Follow-up/additional notes:         "

## 2017-09-18 NOTE — TELEPHONE ENCOUNTER
Drug Name: neoral 25mg  Last Fill Date: 8/21/17  Quantity: 120    Hallie Mauricio   [facility]  [phone]

## 2017-09-21 NOTE — NURSING NOTE
Diet: Patient instructed regarding a heart failure healthy diet, including discussion of reduced fat and 2000 mg daily sodium restriction, daily weights, medication purpose and compliance, fluid restrictions and resources for patient and family to access for assistance with heart failure management.       Labs: Patient was given results of the laboratory testing obtained today and patient was instructed about when to return for the next laboratory testing.    Med Reconcile: Reviewed and verified all current medications with the patient. The updated medication list was printed and given to the patient.    Return Appointment: Patient given instructions regarding scheduling next clinic visit.     Patient stated he understood all health information given and agreed to call with further questions or concerns.    Todays visit: called in a script of metolazone 2.5mg to [facility] [phone]. Pt instructed to take 30 minutes before dose of lasix this afternoon. I will call Monday to check in with pt. F/u appt oct 12 at 230 with Melissa w/ labs prior.

## 2017-09-21 NOTE — NURSING NOTE
"Chief Complaint   Patient presents with     Follow Up For     2.5 month return CORE: 83 yo male. HFpEF. Last clinic visit on 7/6 w/Susy. Patient notes that he feels he has fluid weight on.  He is SOB and has some burning in his chest.       Initial /77 (BP Location: Right arm, Patient Position: Chair, Cuff Size: Adult Large)  Pulse 72  Wt 96.6 kg (213 lb)  SpO2 92%  BMI 34.38 kg/m2 Estimated body mass index is 34.38 kg/(m^2) as calculated from the following:    Height as of 5/23/17: 1.676 m (5' 6\").    Weight as of this encounter: 96.6 kg (213 lb)..  BP completed using cuff size: YOLANDA Suero.        "

## 2017-09-21 NOTE — TELEPHONE ENCOUNTER
[facility] calling to clarity lasix dose, she also [facility] Will contact [facility] nurse for clarification of Lasix ([facility] [phone])
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
## 2017-09-21 NOTE — PATIENT INSTRUCTIONS
"You were seen today in the Cardiovascular Clinic at the [facility].     Cardiology Providers you saw during your visit: JERSON Delacruz       1.Please make a follow-up CORE/heart failure appt with Melissa Clem on Oct 12 with labs prior.   2.  Take 2.5 mg of metolazone at 2:30 pm today, 30 minutes before 3 pm furosemide dose of 80 mg.    3.  Continue to monitor weight daily.  4.  Alexus Ridley RN will call you on Monday to learn about your response to the metolazone.    5.  We may repeat metolazone 1x/week.        Please limit your fluid intake to 2 L (64 ounces) daily.  2 Liters a day = 8.5 cups, or 72 ounces.  Please limit your salt intake to 2 grams a day or less.    If you gain 2# in 24 hours or 5# in one week call Alexus Ridley RN so we can adjust your medications as needed over the phone.    Please feel free to call me with any questions or concerns.      Alexus Ridley RN BSN   [facility]  Cardiology Care Coordinator-Heart Failure Clinic    Questions and [phone].   First press #1 for the Mobile [employer] and then press #3 for \"Medical Questions\" to reach us Cardiology Nurses.     On Call Cardiologist for after hours or on weekends: [phone]   option #4 and ask to speak to the on-call Cardiologist. Inform them you are a CORE/heart failure patient at the [facility].        If you need a medication refill please contact your pharmacy.  Please allow 3 business days for your refill to be completed.  _______________________________________________________  C.O.R.E[facility] CLINIC Cardiomyopathy, Optimization, Rehabilitation, Education   The C.O.R.E. CLINIC is a heart failure specialty clinic within the [facility] where you will work with specialized nurse practitioners dedicated to helping patients with heart failure carefully adjust medications, receive education, and learn who and when to call if symptoms develop. They specialize in helping " you better understand your condition, slow the progression of your disease, improve the length and quality of your life, help you detect future heart problems before they become life threatening, and avoid hospitalizations.  As always, thank you for trusting us with your health care needs!

## 2017-09-21 NOTE — MR AVS SNAPSHOT
"              After Visit Summary   2017    Ramos Oconnor    MRN: [id number]           Patient Information     Date Of Birth          1933        Visit Information        Provider Department      2017 9:00 AM Melissa Nj APRN [facility] AT [facility]        Today's Diagnoses     Chronic diastolic heart failure (H)    -  1    Chronic pain syndrome          Care Instructions    You were seen today in the Cardiovascular Clinic at the [facility].     Cardiology Providers you saw during your visit: JERSON Delacruz       1.Please make a follow-up CORE/heart failure appt with Melissa Nj on Oct 12 with labs prior.   2.  Take 2.5 mg of metolazone at 2:30 pm today, 30 minutes before 3 pm furosemide dose of 80 mg.    3.  Continue to monitor weight daily.  4.  Alexus Ridley RN will call you on Monday to learn about your response to the metolazone.    5.  We may repeat metolazone 1x/week.        Please limit your fluid intake to 2 L (64 ounces) daily.  2 Liters a day = 8.5 cups, or 72 ounces.  Please limit your salt intake to 2 grams a day or less.    If you gain 2# in 24 hours or 5# in one week call Alexus Ridley RN so we can adjust your medications as needed over the phone.    Please feel free to call me with any questions or concerns.      Alexus Ridley RN BSN   [facility]  Cardiology Care Coordinator-Heart Failure Clinic    Questions and [phone].   First press #1 for the People to Remember and then press #3 for \"Medical Questions\" to reach us Cardiology Nurses.     On Call Cardiologist for after hours or on weekends: [phone]   option #4 and ask to speak to the on-call Cardiologist. Inform them you are a CORE/heart failure patient at the [facility].        If you need a medication refill please contact your pharmacy.  Please allow 3 business days for your refill to be " completed.  _______________________________________________________  C.O.R.E. CLINIC Cardiomyopathy, Optimization, Rehabilitation, Education   The C.O.R.E. CLINIC is a heart failure specialty clinic within the [facility] where you will work with specialized nurse practitioners dedicated to helping patients with heart failure carefully adjust medications, receive education, and learn who and when to call if symptoms develop. They specialize in helping you better understand your condition, slow the progression of your disease, improve the length and quality of your life, help you detect future heart problems before they become life threatening, and avoid hospitalizations.  As always, thank you for trusting us with your health care needs!                Follow-ups after your visit        Your next 10 appointments already scheduled     Oct 03, 2017  2:30 PM CDT   Return Visit with Josue Wilkinson MD   [facility] ([facility]    [address] [place] [id number] [phone]            Oct 06, 2017 10:00 AM CDT   (Arrive by 9:45 AM)   CT CHEST W/O CONTRAST with UCCT1   [facility] [place] [facility] ([facility])    [address] [id number] [phone]           Please bring any scans or X-rays taken at other hospitals, if similar tests were done. Also bring a list of your medicines, including vitamins, minerals and over-the-counter drugs. It is safest to leave personal items at home.  Be sure to tell your doctor:   If you have any allergies.   If there s any chance you are pregnant.   If you are breastfeeding.   If you have any special needs.  You do not need to do anything special to prepare.  Please wear loose clothing, such as a sweat suit or jogging clothes. Avoid snaps, zippers and other metal. We may ask you to undress and put on a hospital gown.            Oct 06,  2017 10:30 AM CDT   (Arrive by 10:15 AM)   Return Visit with Eh Munoz MD   [facility] ([facility] [place])    [address] [place] [id number] [phone]            Nov 01, 2017 10:00 AM CDT   LAB with NL LAB [facility] ([facility])    [address] [facility] [id number] [phone]           Patient must bring picture ID. Patient should be prepared to give a urine specimen  Please do not eat 10-12 hours before your appointment if you are coming in fasting for labs on lipids, cholesterol, or glucose (sugar). Pregnant women should follow their Care Team instructions. Water with medications is okay. Do not drink coffee or other fluids. If you have concerns about taking  your medications, please ask at office or if scheduling via [employer], send a message by clicking on Secure Messaging, Message Your Care Team.            Nov 02, 2017 12:00 PM CDT   Core Return with Olive Huerta NP   [facility] AT [facility] ([facility])    [address] [place] [id number]   [phone]            Dec 05, 2017  3:10 PM CST   (Arrive by 2:40 PM)   Return Kidney Transplant with Vaughn Holley MD   [facility] ([facility])    [address] [place] [id number] [phone]              Who to contact     If you have questions or need follow up information about today's clinic visit or your schedule please contact [facility] AT [facility] directly at [phone].  Normal or non-critical lab and imaging results will be communicated to you by MyChart, letter or phone within 4 business days after the clinic has received the results. If you do not hear from us within 7 days, please contact the clinic through MyChart or phone. If you have a  critical or abnormal lab result, we will notify you by phone as soon as possible.  Submit refill requests through [employer] or call your pharmacy and they will forward the refill request to us. Please allow 3 business days for your refill to be completed.          Additional Information About Your Visit        [employer] Information     [employer] gives you secure access to your electronic health record. If you see a primary care provider, you can also send messages to your care team and make appointments. If you have questions, please call your primary care clinic.  If you do not have a primary care provider, please call [phone] and they will assist you.        Care EveryWhere ID     This is your Care EveryWhere ID. This could be used by other organizations to access your [facility] medical records  [id number]        Your Vitals Were     Pulse Pulse Oximetry BMI (Body Mass Index)             72 92% 34.38 kg/m2          Blood Pressure from Last 3 Encounters:   09/21/17 123/77   07/06/17 97/58   05/23/17 120/66    Weight from Last 3 Encounters:   09/21/17 96.6 kg (213 lb)   07/06/17 95.3 kg (210 lb)   05/23/17 92.4 kg (203 lb 12.8 oz)              Today, you had the following     No orders found for display         Today's Medication Changes          These changes are accurate as of: 9/21/17 10:08 AM.  If you have any questions, ask your nurse or doctor.               Start taking these medicines.        Dose/Directions    metolazone 2.5 MG tablet   Commonly known as:  ZAROXOLYN   Used for:  Chronic pain syndrome   Started by:  Melissa Nj APRN CNP        Dose:  2.5 mg   Take 1 tablet (2.5 mg) by mouth daily   Quantity:  5 tablet   Refills:  0         These medicines have changed or have updated prescriptions.        Dose/Directions    gabapentin 100 MG capsule   Commonly known as:  NEURONTIN   This may have changed:  when to take this   Used for:  Chronic pain syndrome        Dose:  100 mg   Take 1 capsule (100  mg) by mouth daily   Quantity:  90 capsule   Refills:  0       glipiZIDE 5 MG tablet   Commonly known as:  GLUCOTROL   This may have changed:  how much to take   Used for:  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)        Dose:  5 mg   Take 1 tablet (5 mg) by mouth 2 times daily (before meals)   Quantity:  30 tablet   Refills:  0       mirtazapine 15 MG tablet   Commonly known as:  REMERON   This may have changed:  how much to take   Used for:  Chronic pain syndrome   Changed by:  Melissa Nj APRN CNP        Dose:  45 mg   Take 3 tablets (45 mg) by mouth At Bedtime   Quantity:  30 tablet   Refills:  0       omeprazole 20 MG CR capsule   Commonly known as:  priLOSEC   This may have changed:    - how much to take  - additional instructions   Used for:  Upset stomach        Dose:  20 mg   Take 1 capsule (20 mg) by mouth 2 times daily   Quantity:  90 capsule   Refills:  0            Where to get your medicines      Some of these will need a paper prescription and others can be bought over the counter.  Ask your nurse if you have questions.     Bring a paper prescription for each of these medications     metolazone 2.5 MG tablet                Primary Care Provider Office Phone # Fax #    Trinidad SHAAN Valencia PA-C [phone]       [address] [place] [address]        Equal Access to Services     VALENTIN GALICIA AH: Mariola nagel Soemelia, waaxda luqadaha, qaybta kaalmada adeconsuelo, janeth clark. So [facility] [phone].    ATENCIÓN: Si habla español, tiene a sharma disposición servicios gratuitos de asistencia lingüística. Fior al [phone].    We comply with applicable federal civil rights laws and [place] laws. We do not discriminate on the basis of race, color, national origin, age, disability sex, sexual orientation or gender identity.            Thank you!     Thank you for choosing [facility] AT  [facility]  for your care. Our goal is always to provide you with excellent care. Hearing back from our patients is one way we can continue to improve our services. Please take a few minutes to complete the written survey that you may receive in the mail after your visit with us. Thank you!             Your Updated Medication List - Protect others around you: Learn how to safely use, store and throw away your medicines at www.disposemymeds.org.          This list is accurate as of: 9/21/17 10:08 AM.  Always use your most recent med list.                   Brand Name Dispense Instructions for use Diagnosis    acidophilus Caps      Take 1 tablet by mouth daily    Chronic pain syndrome       albuterol 108 (90 BASE) MCG/ACT Inhaler    PROAIR HFA/PROVENTIL HFA/VENTOLIN HFA    1 Inhaler    Inhale 2 puffs every 4-6 hours as needed for shortness of breath.    Allergic state, initial encounter       APIXABAN PO      Take 2.5 mg by mouth 2 times daily        blood glucose monitoring test strip    ACCU-CHEK DAT    100 each    Use to test blood sugars 3 times daily or as directed.    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), Type 2 diabetes mellitus with diabetic nephropathy (H)       calcium carbonate 1250 MG tablet    OS-RASTA 500 mg [place]. Ca    90 tablet    Take 1 tablet (500 mg) by mouth daily    Pneumonia, organism unspecified, unspecified laterality, unspecified part of lung       carvedilol 3.125 MG tablet    COREG    180 tablet    Take 1 tablet (3.125 mg) by mouth 2 times daily (with meals)    Chronic atrial fibrillation (H)       [employer] NATURAL FISH OIL 1000 MG Caps     90 capsule    Take 1 g by mouth daily    Hyperlipidemia LDL goal <100       cycloSPORINE modified capsule     120 capsule    Take 2 capsules (50 mg) by mouth every 12 hours    Organ transplant       furosemide 40 MG tablet    LASIX    150 tablet    Take Lasix 80 mg (2 tablets) twice daily and an addition 40 mg (1 tablet) daily, as  needed for weight gain and shortness of breath    Chronic diastolic heart failure (H), Chronic diastolic heart failure (H)       gabapentin 100 MG capsule    NEURONTIN    90 capsule    Take 1 capsule (100 mg) by mouth daily    Chronic pain syndrome       glipiZIDE 5 MG tablet    GLUCOTROL    30 tablet    Take 1 tablet (5 mg) by mouth 2 times daily (before meals)    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)       hydroxypropyl methylcellulose 0.2 % Soln ophthalmic solution    GENTEAL    1 Bottle    Apply 1 drop to eye 4 times daily as needed Both eyes    Allergic state, initial encounter       insulin glargine 100 UNIT/ML injection    LANTUS     Inject Subcutaneous At Bedtime        isosorbide mononitrate 60 MG 24 hr tablet    IMDUR    90 tablet    Take 1 tablet (60 mg) by mouth daily    Chronic atrial fibrillation (H)       levETIRAcetam 500 MG tablet    KEPPRA    60 tablet    Take 1 tablet (500 mg) by mouth 2 times daily    Sensory seizure (H)       loratadine 10 MG tablet    CLARITIN    30 tablet    Take 1 tablet (10 mg) by mouth three times a week Take on Monday, Wednesday and Friday    Allergic state, initial encounter       metolazone 2.5 MG tablet    ZAROXOLYN    5 tablet    Take 1 tablet (2.5 mg) by mouth daily    Chronic pain syndrome       mirtazapine 15 MG tablet    REMERON    30 tablet    Take 3 tablets (45 mg) by mouth At Bedtime    Chronic pain syndrome       mycophenolate 250 MG capsule    GENERIC EQUIVALENT    180 capsule    Take 3 capsules (750 mg) by mouth 2 times daily    Kidney replaced by transplant       nitroGLYcerin 0.4 MG sublingual tablet    NITROSTAT    25 tablet    Place 1 tablet (0.4 mg) under the tongue every 5 minutes as needed for chest pain    Chronic atrial fibrillation (H)       omeprazole 20 MG CR capsule    priLOSEC    90 capsule    Take 1 capsule (20 mg) by mouth 2 times daily    Upset stomach       order for DME     1 each    Equipment being ordered:   Glucose  testing monitor with test strips and lancets. Tests 1 time per day.    Type 2 diabetes, HbA1c goal < 7% (H)       polyethylene glycol Packet    MIRALAX/GLYCOLAX    7 packet    Take 17 g by mouth daily as needed for constipation    Slow transit constipation       senna-docusate 8.6-50 MG per tablet    SENOKOT-S;PERICOLACE    30 tablet    Take 1 tablet by mouth 2 times daily as needed    Slow transit constipation       sertraline 25 MG tablet    ZOLOFT    30 tablet    Take 1 tablet (25 mg) by mouth 2 times daily    Chronic pain syndrome       simvastatin 10 MG tablet    ZOCOR    90 tablet    Take 1 tablet (10 mg) by mouth At Bedtime    Hyperlipidemia LDL goal <100       sodium chloride 0.65 % nasal spray    OCEAN     Spray 1 spray into both nostrils every hour as needed for congestion    Allergic state, initial encounter       sulfamethoxazole-trimethoprim 400-80 MG per tablet    BACTRIM/SEPTRA    12 tablet    Take 1 tablet by mouth Every Mon, Wed, Fri Morning    Kidney replaced by transplant       tamsulosin 0.4 MG capsule    FLOMAX    90 capsule    Take 1 capsule (0.4 mg) by mouth daily    Urinary retention       VITAMIN B-12 IJ      Inject 1,000 mcg as directed every 30 days.
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
## 2017-09-25 NOTE — PROGRESS NOTES
Pt called to discuss how he's feeling after taking Metolazone last Thursday. No changes. Weight has remained the same. Pt continues to feel lousy with continual SOB with exertion. Symptoms discussed with Melissa KHAN RN and the following orders given.     Date: 9/25/2017    Time of Call: 1:50 PM     Diagnosis:  Heart failure     [ VORB ] Ordering provider: Melissa Nj  Order: take another dose of metolazone 2.5mg po x 1.      Order received by: Alexus Ridley RN     Follow-up/additional notes: Orders communicated over the phone to Mrs. Oconnor. Wife verbalized an understanding of the plan of care.

## 2017-10-03 NOTE — MR AVS SNAPSHOT
After Visit Summary   10/3/2017    Ramos Oconnor    MRN: [id number]           Patient Information     Date Of Birth          7/5/1933        Visit Information        Provider Department      10/3/2017 2:30 PM Josue Wilkinson MD [facility]        Today's Diagnoses     Atrial fibrillation (H)    -  1    Chronic pain syndrome          Care Instructions      The following is a summary of your office visit:    Medications started today: metolazone (ZAROXOLYN) 2.5 MG tablet and Coumadin    Take the metolazone starting TONIGHT 30 mintutes prior to your lasix.  Take metolazone tomorrow morning 30 minutes prior to your lasix  Take metolazone tomorrow evening 30 minutes prior to your lasix  Take metolazone on Thursday morning (10/05/2017) 30 minutes prior to your lasix    Medications stopped today:none    Medication dose change: none    Nurse contact information: Radha Singleton RN  Cardiology Care Coordinator  [phone] Phone  [phone] Fax    Appointments made today: Lab on Monday, Follow up. The INR nurse from [facility] will call you to set up an appointment about the warfarin/coumadin clinic.    Patient instructions: Go to [facility] on Thursday. We will call you on Monday for a status update. Complete labs on Monday as well. If things worsen or fail to improve, report straight to the emergency department.      If you have had any blood work, imaging or other testing completed we will be in touch within 1-2 weeks regarding the results. If you have any questions, concerns or need to schedule a follow up, please contact us at [phone]. If you are needing refills please contact your pharmacy. For urgent after hour care please call the [facility] at [phone] or the [facility] at [phone] and ask to speak to the cardiologist on call.    It was a pleasure meeting with you today. Please let us know if there is anything else we can do for you  so that we can be sure you are leaving completely satisfied with your care experience.     Your Cardiology Team at [facility]  RN Care Coordinator: Radha Miller                    Follow-ups after your visit        Additional Services     INR CLINIC REFERRAL       Your provider has referred you to INR Services.    Please be aware that coverage of these services is subject to the terms and limitations of your health insurance plan.  Call member services at your health plan with any benefit or coverage questions.    Indication for Anticoagulation: Atrial Fibrillation  If nonstandard INR is desired, indicate goal range and explanation: standard  Expected Duration of Therapy: Lifetime                  Your next 10 appointments already scheduled     Oct 06, 2017 10:00 AM CDT   (Arrive by 9:45 AM)   CT CHEST W/O CONTRAST with UCCT1   [facility] ([facility])    [address] [place] [address] [phone]           Please bring any scans or X-rays taken at other hospitals, if similar tests were done. Also bring a list of your medicines, including vitamins, minerals and over-the-counter drugs. It is safest to leave personal items at home.  Be sure to tell your doctor:   If you have any allergies.   If there s any chance you are pregnant.   If you are breastfeeding.   If you have any special needs.  You do not need to do anything special to prepare.  Please wear loose clothing, such as a sweat suit or jogging clothes. Avoid snaps, zippers and other metal. We may ask you to undress and put on a hospital gown.            Oct 06, 2017 10:30 AM CDT   (Arrive by 10:15 AM)   Return Visit with Eh Munoz MD   [facility] ([facility])    [address] [place] [address] [phone]            Oct 10, 2017 11:00 AM CDT   LAB with NL LAB [facility] ([facility])    [address] [facility] [address]   [phone]           Patient must bring picture ID. Patient should be prepared to give a urine specimen  Please do not eat 10-12 hours before your appointment if you are coming in fasting for labs on lipids, cholesterol, or glucose (sugar). Pregnant women should follow their Care Team instructions. Water with medications is okay. Do not drink coffee or other fluids. If you have concerns about taking  your medications, please ask at office or if scheduling via Last.fmt, send a message by clicking on Secure Messaging, Message Your Care Team.            Oct 12, 2017  2:30 PM CDT   Core Return with JERSON Madera CNP   [facility] ([facility])    [address] [place] [address]   [phone]            Nov 01, 2017 10:00 AM CDT   LAB with NL LAB [facility] ([facility])    [address] [facility] [address]   [phone]           Patient must bring picture ID. Patient should be prepared to give a urine specimen  Please do not eat 10-12 hours before your appointment if you are coming in fasting for labs on lipids, cholesterol, or glucose (sugar). Pregnant women should follow their Care Team instructions. Water with medications is okay. Do not drink coffee or other fluids. If you have concerns about taking  your medications, please ask at office or if scheduling via [employer], send a message by clicking on Secure Messaging, Message Your Care Team.            Nov 02, 2017 12:00 PM CDT   Core Return with Olive Huerta NP   [facility] AT [facility] ([facility])    [address] [place] [address]   [phone]            Nov 07, 2017  2:00 PM CST   Return Visit with Josue Wilkinson MD   [facility]  [facility]    [address] [address] [place] [address] [phone]            Dec 05, 2017  3:10 PM CST   (Arrive by 2:40 PM)   Return Kidney Transplant with Vaughn Holley MD   [facility] ([facility])    [address] [facility]  [address] [facility] [address] [phone]              Future tests that were ordered for you today     Open Future Orders        Priority Expected Expires Ordered    Basic metabolic panel Routine 10/9/2017 10/3/2018 10/3/2017            Who to contact     If you have questions or need follow up information about today's clinic visit or your schedule please contact [facility] directly at [phone].  Normal or non-critical lab and imaging results will be communicated to you by [employer]hart, letter or phone within 4 business days after the clinic has received the results. If you do not hear from us within 7 days, please contact the clinic through [employer] or phone. If you have a critical or abnormal lab result, we will notify you by phone as soon as possible.  Submit refill requests through [employer] or call your pharmacy and they will forward the refill request to us. Please allow 3 business days for your refill to be completed.          Additional Information About Your Visit        e|tabt Information     [employer] gives you secure access to your electronic health record. If you see a primary care provider, you can also send messages to your care team and make appointments. If you have questions, please call your primary care clinic.  If you do not have a primary care provider, please call [phone] and they will assist you.      [employer] is an electronic gateway that provides easy, online access to your medical records. With [employer], you can request a clinic appointment, read your test results, renew a prescription or communicate with your care team.     To access your existing account, please contact your  [facility] or call [phone] for assistance.        Care EveryWhere ID     This is your Care EveryWhere ID. This could be used by other organizations to access your [facility] medical records  [id number]        Your Vitals Were     Pulse Pulse Oximetry BMI (Body Mass Index)             74 97% 34.48 kg/m2          Blood Pressure from Last 3 Encounters:   10/03/17 111/68   09/21/17 123/77   07/06/17 97/58    Weight from Last 3 Encounters:   10/03/17 96.9 kg (213 lb 9.6 oz)   09/21/17 96.6 kg (213 lb)   07/06/17 95.3 kg (210 lb)              We Performed the Following     INR CLINIC REFERRAL     INR          Today's Medication Changes          These changes are accurate as of: 10/3/17  3:14 PM.  If you have any questions, ask your nurse or doctor.               These medicines have changed or have updated prescriptions.        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)        Dose:  5 mg   Take 1 tablet (5 mg) by mouth 2 times daily (before meals)   Quantity:  30 tablet   Refills:  0       metolazone 2.5 MG tablet   Commonly known as:  ZAROXOLYN   This may have changed:    - how much to take  - how to take this  - when to take this  - additional instructions   Used for:  Chronic pain syndrome   Changed by:  Josue Wilkinson MD        Take one pill (2.5mg) 30 minutes prior to morning diuretic dose only as directed   Quantity:  30 tablet   Refills:  3       omeprazole 20 MG CR capsule   Commonly known as:  priLOSEC   This may have changed:    - how much to take  - additional instructions   Used for:   Upset stomach        Dose:  20 mg   Take 1 capsule (20 mg) by mouth 2 times daily   Quantity:  90 capsule   Refills:  0            Where to get your medicines      These medications were sent to [id number] - [place], [place] - [address] [place] [address] W.  [address] [place], [place] [address] [address]     Phone:  [phone]     metolazone 2.5 MG tablet                Primary Care Provider Office Phone # Fax #    Trinidad SHAAN Valencia PA-C [phone]       [address] [place] [address] 100  [facility] [address]        Equal Access to Services     [facility]: Hadii aad ku hadasho Soomaali, waaxda luqadaha, qaybta kaalmada adeegyada, janeth lamb . So [facility] [phone].    ATENCIÓN: Si habla español, tiene a sharma disposición servicios gratuitos de asistencia lingüística. [facility] [phone].    We comply with applicable federal civil rights laws and [place] laws. We do not discriminate on the basis of race, color, national origin, age, disability, sex, sexual orientation, or gender identity.            Thank you!     Thank you for choosing [facility]  for your care. Our goal is always to provide you with excellent care. Hearing back from our patients is one way we can continue to improve our services. Please take a few minutes to complete the written survey that you may receive in the mail after your visit with us. Thank you!             Your Updated Medication List - Protect others around you: Learn how to safely use, store and throw away your medicines at www.disposemymeds.org.          This list is accurate as of: 10/3/17  3:14 PM.  Always use your most recent med list.                   Brand Name Dispense Instructions for use Diagnosis    acidophilus Caps      Take 1 tablet by mouth daily    Chronic pain syndrome       albuterol 108 (90 BASE) MCG/ACT Inhaler    PROAIR HFA/PROVENTIL HFA/VENTOLIN HFA    1 Inhaler    Inhale 2 puffs every 4-6 hours as needed for  shortness of breath.    Allergic state, initial encounter       APIXABAN PO      Take 2.5 mg by mouth 2 times daily        blood glucose monitoring test strip    ACCU-CHEK DAT    100 each    Use to test blood sugars 3 times daily or as directed.    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), Type 2 diabetes mellitus with diabetic nephropathy (H)       calcium carbonate 1250 MG tablet    OS-RASTA 500 mg [place]. [place]    90 tablet    Take 1 tablet (500 mg) by mouth daily    Pneumonia, organism unspecified, unspecified laterality, unspecified part of lung       carvedilol 3.125 MG tablet    COREG    180 tablet    Take 1 tablet (3.125 mg) by mouth 2 times daily (with meals)    Chronic atrial fibrillation (H)       [employer] NATURAL FISH OIL 1000 MG Caps     90 capsule    Take 1 g by mouth daily    Hyperlipidemia LDL goal <100       cycloSPORINE modified 25 MG capsule     120 capsule    Take 2 capsules (50 mg) by mouth every 12 hours    Organ transplant       furosemide 40 MG tablet    LASIX    150 tablet    Take Lasix 80 mg (2 tablets) twice daily and an addition 40 mg (1 tablet) daily, as needed for weight gain and shortness of breath    Chronic diastolic heart failure (H), Chronic diastolic heart failure (H)       gabapentin 100 MG capsule    NEURONTIN    90 capsule    Take 1 capsule (100 mg) by mouth daily    Chronic pain syndrome       glipiZIDE 5 MG tablet    GLUCOTROL    30 tablet    Take 1 tablet (5 mg) by mouth 2 times daily (before meals)    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)       hydroxypropyl methylcellulose 0.2 % Soln ophthalmic solution    GENTEAL    1 Bottle    Apply 1 drop to eye 4 times daily as needed Both eyes    Allergic state, initial encounter       insulin glargine 100 UNIT/ML injection    LANTUS     Inject Subcutaneous At Bedtime        isosorbide mononitrate 60 MG 24 hr tablet    IMDUR    90 tablet    Take 1 tablet (60 mg) by mouth daily    Chronic atrial fibrillation  (H)       levETIRAcetam 500 MG tablet    KEPPRA    60 tablet    Take 1 tablet (500 mg) by mouth 2 times daily    Sensory seizure (H)       loratadine 10 MG tablet    CLARITIN    30 tablet    Take 1 tablet (10 mg) by mouth three times a week Take on Monday, Wednesday and Friday    Allergic state, initial encounter       metolazone 2.5 MG tablet    ZAROXOLYN    30 tablet    Take one pill (2.5mg) 30 minutes prior to morning diuretic dose only as directed    Chronic pain syndrome       mirtazapine 15 MG tablet    REMERON    30 tablet    Take 3 tablets (45 mg) by mouth At Bedtime    Chronic pain syndrome       mycophenolate 250 MG capsule    GENERIC EQUIVALENT    180 capsule    Take 3 capsules (750 mg) by mouth 2 times daily    Kidney replaced by transplant       nitroGLYcerin 0.4 MG sublingual tablet    NITROSTAT    25 tablet    Place 1 tablet (0.4 mg) under the tongue every 5 minutes as needed for chest pain    Chronic atrial fibrillation (H)       omeprazole 20 MG CR capsule    priLOSEC    90 capsule    Take 1 capsule (20 mg) by mouth 2 times daily    Upset stomach       order for DME     1 each    Equipment being ordered:   Glucose testing monitor with test strips and lancets. Tests 1 time per day.    Type 2 diabetes, HbA1c goal < 7% (H)       polyethylene glycol Packet    MIRALAX/GLYCOLAX    7 packet    Take 17 g by mouth daily as needed for constipation    Slow transit constipation       senna-docusate 8.6-50 MG per tablet    SENOKOT-S;PERICOLACE    30 tablet    Take 1 tablet by mouth 2 times daily as needed    Slow transit constipation       sertraline 25 MG tablet    ZOLOFT    30 tablet    Take 1 tablet (25 mg) by mouth 2 times daily    Chronic pain syndrome       simvastatin 10 MG tablet    ZOCOR    90 tablet    Take 1 tablet (10 mg) by mouth At Bedtime    Hyperlipidemia LDL goal <100       sodium chloride 0.65 % nasal spray    OCEAN     Spray 1 spray into both nostrils every hour as needed for congestion     Allergic state, initial encounter       sulfamethoxazole-trimethoprim 400-80 MG per tablet    BACTRIM/SEPTRA    12 tablet    Take 1 tablet by mouth Every Mon, Wed, Fri Morning    Kidney replaced by transplant       tamsulosin 0.4 MG capsule    FLOMAX    90 capsule    Take 1 capsule (0.4 mg) by mouth daily    Urinary retention       VITAMIN B-12 IJ      Inject 1,000 mcg as directed every 30 days.

## 2017-10-03 NOTE — NURSING NOTE
"Ramos Oconnor's goals for this visit include:   Chief Complaint   Patient presents with     RECHECK     6 month follow up       He requests these members of his care team be copied on today's visit information: PCP    PCP: Trinidad Valencia    Referring Provider:  No referring provider defined for this encounter.    Chief Complaint   Patient presents with     RECHECK     6 month follow up       Initial /68 (BP Location: Right arm, Patient Position: Chair, Cuff Size: Adult Large)  Pulse 74  Wt 96.9 kg (213 lb 9.6 oz)  SpO2 97%  BMI 34.48 kg/m2 Estimated body mass index is 34.48 kg/(m^2) as calculated from the following:    Height as of 5/23/17: 1.676 m (5' 6\").    Weight as of this encounter: 96.9 kg (213 lb 9.6 oz).  Medication Reconciliation: complete         Medication Refills: none        Angela Sullivan CMA        "

## 2017-10-03 NOTE — TELEPHONE ENCOUNTER
Please review, complete, and sign the INR referral that is cued up. Note: when pt was on Coumadin in the past his goal range was 2.0-2.5.     Caro Mcallister RN

## 2017-10-03 NOTE — TELEPHONE ENCOUNTER
Chart reviewed, looks like he won't be on warfarin, copied note below:    3:50 PM-Advised Dr Wilkinson that warfarin has a very high interaction with bactrim.      [ VORB ] Ordering provider: Dr Herb Wilkinson     Order: Don't start the warfarin, keep him on apixaban.      Order received by: AUGUSTINE Washington      Follow-up/additional notes: Caught patient and wife in lobby before they left the building. Informed them of change. Will call pharmacy and cancel RX and cancel INR referral.

## 2017-10-03 NOTE — NURSING NOTE
3:50 PM-Advised Dr Wilkinson that warfarin has a very high interaction with bactrim.     [ VORB ] Ordering provider: Dr Herb Wilkinson    Order: Don't start the warfarin, keep him on apixaban.     Order received by: AUGUSTINE Washington     Follow-up/additional notes: Caught patient and wife in lobby before they left the building. Informed them of change. Will call pharmacy and cancel RX and cancel INR referral.

## 2017-10-03 NOTE — TELEPHONE ENCOUNTER
The message I have pasted below came after I had already left a VM for pt.   I informed Radha that there is an interaction between the two medications but the Coumadin dose can always be adjusted to make INR therapeutic. Waiting to hear back from her.       Caro Mcallister RN    Apparently warfarin and bactrim have a nasty interaction so patient is staying on apixaban! No warfarin.       Radha

## 2017-10-03 NOTE — TELEPHONE ENCOUNTER
See note below.   It appears that pt was on Coumadin in the past- not sure if education consult is necessary. He lives in [place], but has gone to the [place] and [facility] clinics in the past. When he was on coumadin in 2016 it looks like he was managed by the .   I have left pt a VM to call [facility] back to discuss making an appt for an INR. Per note below, he will be started on 5 mg daily and should stay on apixaban until INR is 2.0.   Note sent to Dr. Wilkinson for INR referral   Caro Mcallister, AUGUSTINE          HI,     Dr Wilkinson is going to switch this man over to coumadin. He wants him to stay on the apixaban until he is therapeutic with INR's.   I will have a baseline INR done today.   He will start on 5 mg of warfarin hopefully tonight if they can pick it up     Call if you have questions.   Radha   [phone]

## 2017-10-03 NOTE — PATIENT INSTRUCTIONS
The following is a summary of your office visit:    Medications started today: metolazone (ZAROXOLYN) 2.5 MG tablet and Coumadin    Take the metolazone starting TONIGHT 30 mintutes prior to your lasix.  Take metolazone tomorrow morning 30 minutes prior to your lasix  Take metolazone tomorrow evening 30 minutes prior to your lasix  Take metolazone on Thursday morning (10/05/2017) 30 minutes prior to your lasix    Medications stopped today:none    Medication dose change: none    Nurse contact information: Radha Singleton RN  Cardiology Care Coordinator  [phone] Phone  [phone] Fax    Appointments made today: Lab on Monday, Follow up. The INR nurse from [facility] will call you to set up an appointment about the warfarin/coumadin clinic.    Patient instructions: Go to [facility] on Thursday. We will call you on Monday for a status update. Complete labs on Monday as well. If things worsen or fail to improve, report straight to the emergency department.      If you have had any blood work, imaging or other testing completed we will be in touch within 1-2 weeks regarding the results. If you have any questions, concerns or need to schedule a follow up, please contact us at [phone]. If you are needing refills please contact your pharmacy. For urgent after hour care please call the [facility] at [phone] or the [facility] at [phone] and ask to speak to the cardiologist on call.    It was a pleasure meeting with you today. Please let us know if there is anything else we can do for you so that we can be sure you are leaving completely satisfied with your care experience.     Your Cardiology Team at [facility]  RN Care Coordinator: Radha MAGUIRE: Angela

## 2017-10-05 NOTE — PROGRESS NOTES
Call placed to check in with patient and see how he has been feeling. Pt stated he recently had an appt with Dr. Wilkinson (10/3) and he prescribed Metolazone daily. Pt states his weight is slowly coming down. Today he is 212.4lbs down from 215 yesterday. Pt states he still has SOB but it has improved a little. Abd swelling is still present. All questions answered and f/u appt on 10/12 with Melissa Nj reviewed.
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
## 2017-10-06 NOTE — MR AVS SNAPSHOT
After Visit Summary   10/6/2017    Ramos Oconnor    MRN: [id number]           Patient Information     Date Of Birth          7/5/1933        Visit Information        Provider Department      10/6/2017 10:30 AM Eh Munoz MD [facility]        Today's Diagnoses     Lung nodule    -  1    ILD (interstitial lung disease) (H)           Follow-ups after your visit        Follow-up notes from your care team     Return in about 3 months (around 1/6/2018).      Your next 10 appointments already scheduled     Oct 09, 2017  9:00 AM CDT   [facility]    [address] [facility] [address]   [phone]           Patient must bring picture ID. Patient should be prepared to give a urine specimen  Please do not eat 10-12 hours before your appointment if you are coming in fasting for labs on lipids, cholesterol, or glucose (sugar). Pregnant women should follow their Care Team instructions. Water with medications is okay. Do not drink coffee or other fluids. If you have concerns about taking  your medications, please ask at office or if scheduling via SocialDefender, send a message by clicking on Secure Messaging, Message Your Care Team.            Oct 10, 2017 11:00 AM CDT   [facility]    [address] [facility] [address]   [phone]           Patient must bring picture ID. Patient should be prepared to give a urine specimen  Please do not eat 10-12 hours before your appointment if you are coming in fasting for labs on lipids, cholesterol, or glucose (sugar). Pregnant women should follow their Care Team instructions. Water with medications is okay. Do not drink coffee or other fluids. If you have concerns about taking  your medications, please ask at office or if scheduling via SocialDefender, send a message by clicking on Secure Messaging, Message  Your Care Team.            Oct 12, 2017  2:30 PM CDT   Core Return with JERSON Madera CNP   [facility] AT [facility] ([facility])    [address] [place] [address]   [phone]            Nov 01, 2017 10:00 AM CDT   LAB with NL LAB [facility] ([facility])    [address] [facility] [address]   [phone]           Patient must bring picture ID. Patient should be prepared to give a urine specimen  Please do not eat 10-12 hours before your appointment if you are coming in fasting for labs on lipids, cholesterol, or glucose (sugar). Pregnant women should follow their Care Team instructions. Water with medications is okay. Do not drink coffee or other fluids. If you have concerns about taking  your medications, please ask at office or if scheduling via [email or web address], send a message by clicking on Secure Messaging, Message Your Care Team.            Nov 02, 2017 12:00 PM CDT   Core Return with Olive Huerta NP   [facility] AT [facility] ([facility])    [address] [place] [address]   [phone]            Nov 07, 2017  2:00 PM CST   [facility] Visit with Josue Wilkinson MD   [facility]    [address] [place] [address]   [phone]            Dec 05, 2017  3:10 PM CST   (Arrive by 2:40 PM)   Return Kidney Transplant with Vaughn Holley MD   [facility] Nephrology ([facility])    [address] [place] [id number] [phone]            Jan 12, 2018  1:30 PM CST   PFT VISIT with HARRISON PEDERSON   [facility] Pulmonary Function Testing ([facility])    [address] [place] [id number] [phone]            Jan 12, 2018  2:00 PM CST   (Arrive by 1:45 PM)   Return Visit  with Eh Munoz MD   [facility] ([facility])    [address] [facility]  [address] [facility] [address] [phone]              Future tests that were ordered for you today     Open Future Orders        Priority Expected Expires Ordered    General PFT Lab (Please always keep checked) Routine 1/31/2018 1/31/2018 10/6/2017    Pulmonary Function Test Routine 1/31/2018 1/31/2018 10/6/2017    CT Chest w/o Contrast Routine 1/31/2018 1/31/2018 10/6/2017            Who to contact     If you have questions or need follow up information about today's clinic visit or your schedule please contact [facility] directly at [phone].  Normal or non-critical lab and imaging results will be communicated to you by [employer]eth[employer] Imaginghart, letter or phone within 4 business days after the clinic has received the results. If you do not hear from us within 7 days, please contact the clinic through [employer]eth[employer] Imaging[employer] or phone. If you have a critical or abnormal lab result, we will notify you by phone as soon as possible.  Submit refill requests through iiyuma or call your pharmacy and they will forward the refill request to us. Please allow 3 business days for your refill to be completed.          Additional Information About Your Visit        iiyuma Information     iiyuma gives you secure access to your electronic health record. If you see a primary care provider, you can also send messages to your care team and make appointments. If you have questions, please call your primary care clinic.  If you do not have a primary care provider, please call [phone] and they will assist you.        Care EveryWhere ID     This is your Care EveryWhere ID. This could be used by other organizations to access your [facility] medical records  [id number]        Your Vitals Were     Pulse Temperature Respirations Height Pulse Oximetry BMI (Body Mass Index)    69 97.8  F  "(36.6  C) (Oral) 18 1.676 m (5' 6\") 93% 34.38 kg/m2       Blood Pressure from Last 3 Encounters:   10/06/17 115/68   10/03/17 111/68   09/21/17 123/77    Weight from Last 3 Encounters:   10/06/17 96.6 kg (213 lb)   10/03/17 96.9 kg (213 lb 9.6 oz)   09/21/17 96.6 kg (213 lb)                 Today's Medication Changes          These changes are accurate as of: 10/6/17 12:14 PM.  If you have any questions, ask your nurse or doctor.               These medicines have changed or have updated prescriptions.        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)        Dose:  5 mg   Take 1 tablet (5 mg) by mouth 2 times daily (before meals)   Quantity:  30 tablet   Refills:  0       omeprazole 20 MG CR capsule   Commonly known as:  priLOSEC   This may have changed:    - how much to take  - additional instructions   Used for:  Upset stomach        Dose:  20 mg   Take 1 capsule (20 mg) by mouth 2 times daily   Quantity:  90 capsule   Refills:  0                Primary Care Provider Office Phone # Fax #    Trinidad SHAAN Valencia PA-C [phone]       [address] [place] [id number]        Equal Access to Services     [facility]: Hadii barry nagel Soemelia, waaxda luqadaha, qaybta kaalmada eric, janeth clark. So [facility] [phone].    ATENCIÓN: Si habla español, tiene a sharma disposición servicios gratuitos de asistencia lingüística. Llame al [phone].    We comply with applicable federal civil rights laws and [place] laws. We do not discriminate on the basis of race, color, " national origin, age, disability, sex, sexual orientation, or gender identity.            Thank you!     Thank you for choosing [facility]  for your care. Our goal is always to provide you with excellent care. Hearing back from our patients is one way we can continue to improve our services. Please take a few minutes to complete the written survey that you may receive in the mail after your visit with us. Thank you!             Your Updated Medication List - Protect others around you: Learn how to safely use, store and throw away your medicines at www.disposemymeds.org.          This list is accurate as of: 10/6/17 12:14 PM.  Always use your most recent med list.                   Brand Name Dispense Instructions for use Diagnosis    acidophilus Caps      Take 1 tablet by mouth daily    Chronic pain syndrome       albuterol 108 (90 BASE) MCG/ACT Inhaler    PROAIR HFA/PROVENTIL HFA/VENTOLIN HFA    1 Inhaler    Inhale 2 puffs every 4-6 hours as needed for shortness of breath.    Allergic state, initial encounter       APIXABAN PO      Take 2.5 mg by mouth 2 times daily        blood glucose monitoring test strip    ACCU-CHEK DAT    100 each    Use to test blood sugars 3 times daily or as directed.    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 Ca    90 tablet    Take 1 tablet (500 mg) by mouth daily    Pneumonia, organism unspecified, unspecified laterality, unspecified part of lung       carvedilol 3.125 MG tablet    COREG    180 tablet    Take 1 tablet (3.125 mg) by mouth 2 times daily (with meals)    Chronic atrial fibrillation (H)       [facility] NATURAL FISH OIL 1000 MG Caps     90 capsule    Take 1 g by mouth daily    Hyperlipidemia LDL goal <100       cycloSPORINE modified 25 MG capsule     120 capsule    Take 2 capsules (50 mg) by mouth every 12 hours     Organ transplant       furosemide 40 MG tablet    LASIX    150 tablet    Take Lasix 80 mg (2 tablets) twice daily and an addition 40 mg (1 tablet) daily, as needed for weight gain and shortness of breath    Chronic diastolic heart failure (H), Chronic diastolic heart failure (H)       gabapentin 100 MG capsule    NEURONTIN    90 capsule    Take 1 capsule (100 mg) by mouth daily    Chronic pain syndrome       glipiZIDE 5 MG tablet    GLUCOTROL    30 tablet    Take 1 tablet (5 mg) by mouth 2 times daily (before meals)    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)       hydroxypropyl methylcellulose 0.2 % Soln ophthalmic solution    GENTEAL    1 Bottle    Apply 1 drop to eye 4 times daily as needed Both eyes    Allergic state, initial encounter       insulin glargine 100 UNIT/ML injection    LANTUS     Inject Subcutaneous At Bedtime        isosorbide mononitrate 60 MG 24 hr tablet    IMDUR    90 tablet    Take 1 tablet (60 mg) by mouth daily    Chronic atrial fibrillation (H)       levETIRAcetam 500 MG tablet    KEPPRA    60 tablet    Take 1 tablet (500 mg) by mouth 2 times daily    Sensory seizure (H)       loratadine 10 MG tablet    CLARITIN    30 tablet    Take 1 tablet (10 mg) by mouth three times a week Take on Monday, Wednesday and Friday    Allergic state, initial encounter       metolazone 2.5 MG tablet    ZAROXOLYN    30 tablet    Take one pill (2.5mg) 30 minutes prior to morning diuretic dose only as directed    Chronic pain syndrome       mirtazapine 15 MG tablet    REMERON    30 tablet    Take 3 tablets (45 mg) by mouth At Bedtime    Chronic pain syndrome       mycophenolate 250 MG capsule    GENERIC EQUIVALENT    180 capsule    Take 3 capsules (750 mg) by mouth 2 times daily    Kidney replaced by transplant       nitroGLYcerin 0.4 MG sublingual tablet    NITROSTAT    25 tablet    Place 1 tablet (0.4 mg) under the tongue every 5 minutes as needed for chest pain    Chronic atrial  fibrillation (H)       omeprazole 20 MG CR capsule    priLOSEC    90 capsule    Take 1 capsule (20 mg) by mouth 2 times daily    Upset stomach       order for DME     1 each    Equipment being ordered:   Glucose testing monitor with test strips and lancets. Tests 1 time per day.    Type 2 diabetes, HbA1c goal < 7% (H)       polyethylene glycol Packet    MIRALAX/GLYCOLAX    7 packet    Take 17 g by mouth daily as needed for constipation    Slow transit constipation       senna-docusate 8.6-50 MG per tablet    SENOKOT-S;PERICOLACE    30 tablet    Take 1 tablet by mouth 2 times daily as needed    Slow transit constipation       sertraline 25 MG tablet    ZOLOFT    30 tablet    Take 1 tablet (25 mg) by mouth 2 times daily    Chronic pain syndrome       simvastatin 10 MG tablet    ZOCOR    90 tablet    Take 1 tablet (10 mg) by mouth At Bedtime    Hyperlipidemia LDL goal <100       sodium chloride 0.65 % nasal spray    OCEAN     Spray 1 spray into both nostrils every hour as needed for congestion    Allergic state, initial encounter       sulfamethoxazole-trimethoprim 400-80 MG per tablet    BACTRIM/SEPTRA    12 tablet    Take 1 tablet by mouth Every Mon, Wed, Fri Morning    Kidney replaced by transplant       tamsulosin 0.4 MG capsule    FLOMAX    90 capsule    Take 1 capsule (0.4 mg) by mouth daily    Urinary retention       VITAMIN B-12 IJ      Inject 1,000 mcg as directed every 30 days.
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
## 2017-10-06 NOTE — NURSING NOTE
Chief Complaint   Patient presents with     RECHECK     Follow up on Ramos and his Lung Nodule     Pete Ruby CMA at 11:10 AM on 10/6/2017

## 2017-10-09 NOTE — TELEPHONE ENCOUNTER
going to [facility] Call Center    Phone Message    Name of Caller: Humaira    Phone Number: Other phone number:  [phone]    Best time to return call: Any    May a detailed message be left on voicemail: yes    Relation to patient: Other Name: Spouse  Relationship: Spouse  Is there legal documentation in chart to discuss information with this person: Yes. Legal Documentation is verified by LL.      Reason for Call: Other: Patients spouse is calling to inform Dr. Wilkinson that patient was taken by ambulance to [facility] for Pneumonia like symptoms. Patients wife states that she was to expect a call back today to go over what was to be recommended regarding  Rx with CHF. Kaylyn will not be home to take call-however ok to leave msg. Please advise.     Action Taken: Message routed to:  Adult Clinics: Cardiology p [phone]

## 2017-10-09 NOTE — TELEPHONE ENCOUNTER
Called and left message on voicemail. Aware of patient going to hospital, will notify Dr Wilkinson and monitor treatment at [facility]. Asked her to call if she had any questions.

## 2017-10-26 ENCOUNTER — POST MORTEM DOCUMENTATION (OUTPATIENT)
Dept: TRANSPLANT | Facility: CLINIC | Age: 82
End: 2017-10-26

## 2017-10-26 NOTE — PROGRESS NOTES
Received notification of patient's death from [facility] review.  Place of death was reported as [facility].  Graft status at the time of death was reported as Functioning.  Additional information:  Pt with history of CHF admitted to outside facility with increasing SOB. He has been struggling with fluid retention. While in the hospital he was started on antibiotics for aspiration pneumonia and diurectics for heart failure. Pt was confused on and off and started having disseminated herpes zoster infection for which acyclovir was started. Pt then started having uncooperative behaviors and told his wife he did not want to get better. Family decided on comfort care and patient  that same day.  TIS verification is: Complete